# Patient Record
Sex: FEMALE | Race: WHITE | NOT HISPANIC OR LATINO | Employment: OTHER | URBAN - METROPOLITAN AREA
[De-identification: names, ages, dates, MRNs, and addresses within clinical notes are randomized per-mention and may not be internally consistent; named-entity substitution may affect disease eponyms.]

---

## 2017-07-19 ENCOUNTER — NON-PROVIDER VISIT (OUTPATIENT)
Dept: NEUROLOGY | Facility: MEDICAL CENTER | Age: 57
End: 2017-07-19
Payer: MEDICAID

## 2017-07-19 DIAGNOSIS — G56.03 CARPAL TUNNEL SYNDROME, BILATERAL: ICD-10-CM

## 2017-07-19 DIAGNOSIS — M54.12 CERVICAL RADICULOPATHY: ICD-10-CM

## 2017-07-19 DIAGNOSIS — R20.2 PARESTHESIAS: ICD-10-CM

## 2017-07-19 PROCEDURE — 99202 OFFICE O/P NEW SF 15 MIN: CPT | Mod: 25 | Performed by: PSYCHIATRY & NEUROLOGY

## 2017-07-19 PROCEDURE — 95910 NRV CNDJ TEST 7-8 STUDIES: CPT | Performed by: PSYCHIATRY & NEUROLOGY

## 2017-07-19 PROCEDURE — 95886 MUSC TEST DONE W/N TEST COMP: CPT | Performed by: PSYCHIATRY & NEUROLOGY

## 2017-07-19 NOTE — MR AVS SNAPSHOT
Iesha Ng Swanson   2017 10:30 AM   Non-Provider Visit   MRN: [id number]    Department:  [facility]   Dept Phone:  [phone]    Description:  Female : 1960   Provider:  [facility]           Reason for Visit     Procedure           Allergies as of 2017     No Known Allergies      You were diagnosed with     Paresthesias   [139239]       Carpal tunnel syndrome, bilateral   [177324]       Cervical radiculopathy   [[id number]]         Vital Signs     Smoking Status                   Unknown If Ever Smoked           Basic Information     Date Of Birth Sex Race Ethnicity Preferred Language    1960 Female White Non- English      Your appointments     2017 10:20 AM   New Patient with Greg Pa M.D.   [facility] Neurology (--)    [address] [place] [address], [address]  [facility] [id number] [phone]           Please bring Photo ID, Insurance Cards, All Medication Bottles and copies of any legal documents (such as Living Will, Power of ) If speaking a language besides English please bring an adult . Please arrive 30 minutes prior for check in and registration. You will be receiving a confirmation call a few days before your appointment from our automated call confirmation system.              Health Maintenance        Date Due Completion Dates    IMM DTaP/Tdap/Td Vaccine (1 - Tdap) 11/10/1978 ---    PAP SMEAR 11/10/1980 ---    MAMMOGRAM 11/10/1999 ---    COLONOSCOPY 11/10/2009 ---    IMM INFLUENZA (1) 2017 ---            Current Immunizations     No immunizations on file.      Below and/or attached are the medications your provider expects you to take. Review all of your home medications and newly ordered medications with your provider and/or pharmacist. Follow medication instructions as directed by your provider and/or pharmacist. Please keep your medication list with you and share with your provider. Update the information when  medications are discontinued, doses are changed, or new medications (including over-the-counter products) are added; and carry medication information at all times in the event of emergency situations     Allergies:  No Known Allergies          Medications  Valid as of: July 19, 2017 - 11:38 AM    Generic Name Brand Name Tablet Size Instructions for use    Citalopram Hydrobromide   Take  by mouth.          Omeprazole (CAPSULE DELAYED RELEASE) PRILOSEC 20 MG Take 20 mg by mouth every day.          Warfarin Sodium (Tab) COUMADIN 1 MG Take 1.5 mg by mouth every day.          .                 Medicines prescribed today were sent to:     None      Medication refill instructions:       If your prescription bottle indicates you have medication refills left, it is not necessary to call your provider’s office. Please contact your pharmacy and they will refill your medication.    If your prescription bottle indicates you do not have any refills left, you may request refills at any time through one of the following ways: The online Zenops system (except Urgent Care), by calling your provider’s office, or by asking your pharmacy to contact your provider’s office with a refill request. Medication refills are processed only during regular business hours and may not be available until the next business day. Your provider may request additional information or to have a follow-up visit with you prior to refilling your medication.   *Please Note: Medication refills are assigned a new Rx number when refilled electronically. Your pharmacy may indicate that no refills were authorized even though a new prescription for the same medication is available at the pharmacy. Please request the medicine by name with the pharmacy before contacting your provider for a refill.        Your To Do List     Future Labs/Procedures Complete By Expires    MR-CERVICAL SPINE-W/O  As directed 7/19/2018         Zenops Access Code:  [email or web address]  Expires: 8/18/2017 11:38 AM    Wheeldo  A secure, online tool to manage your health information     [employer]’s Wheeldo® is a secure, online tool that connects you to your personalized health information from the privacy of your home -- day or night - making it very easy for you to manage your healthcare. Once the activation process is completed, you can even access your medical information using the Wheeldo patrice, which is available for free in the Apple Patrice store or Google Play store.     Wheeldo provides the following levels of access (as shown below):   My Chart Features   [facility] Primary Care Doctor [facility]  Specialists [facility]  Urgent  Care Non-[facility]  Primary Care  Doctor   Email your healthcare team securely and privately 24/7 X X X    Manage appointments: schedule your next appointment; view details of past/upcoming appointments X      Request prescription refills. X      View recent personal medical records, including lab and immunizations X X X X   View health record, including health history, allergies, medications X X X X   Read reports about your outpatient visits, procedures, consult and ER notes X X X X   See your discharge summary, which is a recap of your hospital and/or ER visit that includes your diagnosis, lab results, and care plan. X X       How to register for Wheeldo:  1. Go to  [email or web address].  2. Click on the Sign Up Now box, which takes you to the New Member Sign Up page. You will need to provide the following information:  a. Enter your Wheeldo Access Code exactly as it appears at the top of this page. (You will not need to use this code after you’ve completed the sign-up process. If you do not sign up before the expiration date, you must request a new code.)   b. Enter your date of birth.   c. Enter your home email address.   d. Click Submit, and follow the next screen’s instructions.  3. Create a Wheeldo ID. This will be your Wheeldo login ID and cannot be  changed, so think of one that is secure and easy to remember.  4. Create a i2we password. You can change your password at any time.  5. Enter your Password Reset Question and Answer. This can be used at a later time if you forget your password.   6. Enter your e-mail address. This allows you to receive e-mail notifications when new information is available in i2we.  7. Click Sign Up. You can now view your health information.    For assistance activating your i2we account, call [phone]

## 2017-07-19 NOTE — PROGRESS NOTES
ELECTRODIAGNOSTIC CONSULTATION:           NERVE CONDUCTION STUDIES AND ELECTROMYOGRAPHY REPORT        DOS: 07/19/2017      Referring provider: Adelita Napoles M.D.      SUMMARY OF PATIENT'S CLINICAL HISTORY,PHYSICAL EXAM, AND RATIONALE FOR TESTING:    Ms. Iesha Swanson 56 y.o. presenting with bilateral upper extremity pain, neck pain, pain and numbness in hands. Studies done for work up of possible bilateral carpal tunnel syndrome, and bilateral cervical radiculopathies.     There is a remote history of CTS bilaterally, s/p decompression about 15 years ago. Current symptoms progressive for the last 10 years.     Patient on chronic anticoagulation with Xarelto. Discussed risk for bleeding. She agreed to proceed.       Past Medical History is significant for :   Past Medical History   Diagnosis Date   • DVT (deep venous thrombosis)    • Depression      A brief examination demonstrated patient not in distress. She was appropriate. She spoke normally. She had normal muscle strength and there were no signs of atrophy. She exhibited normal coordination and gait.       ELECTRODIAGNOSTIC EXAMINATION:  Nerve conduction studies (NCS) and electromyography (EMG) are utilized to evaluate direct or indirect damage to the peripheral nervous system. NCS are performed to measure the nerve(s) response(s) to electrostimulation across a given nerve segment. EMG evaluates the passive and active electrical activity of the muscle(s) in question.  Muscles are innervated by specific peripheral nerves and roots. Often times, several nerves the muscle to be examined in order to determine the presence or absence of the disease process. Furthermore, nerves and muscles may need to be tested in a [id number] comparison, as well as in additional extremities, as this may be crucial in characterizing the extent of the disease process, which may be diffuse or isolated and of varying degree of severity. The extent of the neurodiagnostic  exam is justified as it may help arrive to a proper diagnosis, which ultimately may contribute to better management of the patient. Therefore, the nerves to muscles examined during the study were medically necessary.    Unless otherwise noted, temperature of the extremity(s) study was monitored before and during the examination and remained between 32 and 36 degrees C for the upper extremities, and between 30 and 36 degrees C for the lower extremities.      NERVE CONDUCTION STUDIES:  Sensory nerves:   - Bilateral Median sensory nerves were examined.The responses were within normal limits.  - Bilateral Ulnar sensory nerves were examined. The responses were within normal limits.    Motor nerves:   - Bilateral Median motor nerves were examined. Recording electrodes placed at the Abductor Pollicis Brevis muscles. The responses were within normal limits.  - Bilateral Ulnar motor nerves were examined. Recording electrodes placed at the Abductor Digiti Minimi muscles. The responses were within normal limits.    No temporal dispersion or conduction block observed in any of the motor nerves examined.    LATE RESPONSES:  F waves were obtained and the following nerves: bilateral median, bilateral ulnar.   The responses within normal limits for the patient's age.        ELECTROMYOGRAPHY:  The study was performed the concentric needle electrode. Fibrillation and fasciculation activity is graded by convention from none (0) to continuous (4+).  Needle electrode examination was performed in the following muscles: bilateral low cervical paraspinals, bilateral deltoids, bilateral biceps, bilateral triceps, bilateral abductor digiti minimi, bilateral abductor pollicis brevis.   The muscles tested demonstrated normal inserctional activity, normal motor unit morphology and recruitment. There were no elements suggestive of active denervation.    Sensory NCS      Nerve / Sites Rec. Site Onset Lat Peak Lat NP Amp PP Amp SPAR Segments  Distance Velocity     ms ms µV µV %  cm m/s   R MEDIAN - Dig II Antidr      Wrist Dig II 2.45 3.30 12.1 29.7 66.7 Wrist - Dig II 14 57.1   L MEDIAN - Dig II Antidr      Wrist Dig II 2.20 2.90 18.2 30.0 100 Wrist - Dig II 14 63.6   R ULNAR - Dig V Antidr      Wrist Dig V 2.25 3.20 15.4 29.3 100 Wrist - Dig V 14 62.2   L ULNAR - Dig V Antidr      Wrist Dig V 2.40 3.00 13.4 61.5 87 Wrist - Dig V 14 58.3               Motor NCS      Nerve / Sites Rec. Site Lat Amp Rel Amp SPAR Segments Dist. Alejandro     ms mV % %  cm m/s   R MEDIAN - APB      Wrist APB 2.90 9.1 100 99.5 Wrist - APB 8       Elbow APB 7.30 8.0 88.1 96.8 Elbow - Wrist 25.5 58.0   L MEDIAN - APB      Wrist APB 3.00 9.1 100 100 Wrist - APB 8       Elbow APB 7.40 8.3 90.5 100 Elbow - Wrist 25 56.8   R ULNAR - ADM      Wrist ADM 2.80 11.7 100 81.1 Wrist - ADM 8       B.Elbow ADM 5.10 9.9 85 83.7 B.Elbow - Wrist 15.5 67.4      A.Elbow ADM 6.80 10.2 87.1 88 A.Elbow - B.Elbow 10 58.8   L ULNAR - ADM      Wrist ADM 2.75 14.4 100 100 Wrist - ADM 8       B.Elbow ADM 5.20 11.8 82.4 100 B.Elbow - Wrist 13 53.1      A.Elbow ADM 6.90 11.5 80.3 100 A.Elbow - B.Elbow 10 58.8               F  Wave      Nerve Fmin    ms   R MEDIAN 25.15   R ULNAR 25.15   L MEDIAN 25.10   L ULNAR 25.30         DIAGNOSTIC INTERPRETATION:   Extensive electrodiagnostic studies were performed to the upper extremities. The studies were within normal limits.      CLINICAL DISCUSSION:   No evidence for peripheral polyneuropathy, median neuropathies (Carpal Tunnel Syndrome), or suggestion of cervical radiculopathies based on this study.       RECOMMENDATION:   Patient complaining of neck pain. NCS/EMG was normal. MRI c spine will be ordered. She can reviewed results with me over the telephone and/or her PCP. Symptoms may otherwise not be neurological in origin.       Giorgi Marquez MD  Medical Director, Epilepsy and Neurodiagnostics.   Clinical  of Neurology [facility].   Diplomate in Neurology, Epilepsy, and Electrodiagnostic Medicine.   Office: [phone]  Fax: [phone]

## 2017-08-03 ENCOUNTER — HOSPITAL ENCOUNTER (OUTPATIENT)
Dept: RADIOLOGY | Facility: MEDICAL CENTER | Age: 57
End: 2017-08-03
Attending: PSYCHIATRY & NEUROLOGY
Payer: MEDICAID

## 2017-08-03 DIAGNOSIS — R20.2 PARESTHESIAS: ICD-10-CM

## 2017-08-03 DIAGNOSIS — M54.12 CERVICAL RADICULOPATHY: ICD-10-CM

## 2017-08-03 PROCEDURE — 72141 MRI NECK SPINE W/O DYE: CPT

## 2017-08-07 ENCOUNTER — TELEPHONE (OUTPATIENT)
Dept: NEUROLOGY | Facility: MEDICAL CENTER | Age: 57
End: 2017-08-07

## 2017-08-07 DIAGNOSIS — M54.2 NECK PAIN: ICD-10-CM

## 2017-08-07 DIAGNOSIS — M54.12 CERVICAL RADICULOPATHY: ICD-10-CM

## 2017-08-07 NOTE — TELEPHONE ENCOUNTER
Giorgi Marquez M.D.  Macey Dickson, Med Ass't       Caller: Unspecified (Today, 2:01 PM)                     I reviewed report and images. Some degenerative changes. Disc herniation at C6-7. Possibly compressing roots bilaterally. We will mail report to her.   It would be up to her if she would like to see a spine surgeon. We can place referral if she would like. A course of PT may help as well. Please advise.   RA       Advised Pt. She would like you to order PT. Please make a comment that she needs a PT in [place].     Thanks Macey ZHOU

## 2021-06-22 ENCOUNTER — TELEPHONE (OUTPATIENT)
Dept: CARDIOLOGY | Facility: MEDICAL CENTER | Age: 61
End: 2021-06-22

## 2021-06-22 NOTE — TELEPHONE ENCOUNTER
Chart Prep    Called patient in regards to records for NP appointment with Dr. Bell on 07/02/2021 at 02:20pm. To review most recent lab results, ekg, any cardiac testing or ,if he has been treated by a cardiologist. No answer, left voicemail to call back

## 2021-06-23 NOTE — TELEPHONE ENCOUNTER
Pt called back. PT is getting labs done but no Cards in the past and no cardiac testing.     Thank you,  Loni CORRIGAN

## 2021-09-02 ENCOUNTER — OFFICE VISIT (OUTPATIENT)
Dept: CARDIOLOGY | Facility: MEDICAL CENTER | Age: 61
End: 2021-09-02
Payer: MEDICAID

## 2021-09-02 VITALS
RESPIRATION RATE: 16 BRPM | DIASTOLIC BLOOD PRESSURE: 82 MMHG | WEIGHT: 178.6 LBS | SYSTOLIC BLOOD PRESSURE: 138 MMHG | BODY MASS INDEX: 30.49 KG/M2 | HEIGHT: 64 IN

## 2021-09-02 DIAGNOSIS — Z72.0 TOBACCO USE: ICD-10-CM

## 2021-09-02 DIAGNOSIS — Z82.49 FAMILY HISTORY OF ABDOMINAL AORTIC ANEURYSM (AAA): ICD-10-CM

## 2021-09-02 DIAGNOSIS — R07.2 PRECORDIAL PAIN: ICD-10-CM

## 2021-09-02 DIAGNOSIS — M48.062 SPINAL STENOSIS OF LUMBAR REGION WITH NEUROGENIC CLAUDICATION: ICD-10-CM

## 2021-09-02 DIAGNOSIS — I73.9 PVD (PERIPHERAL VASCULAR DISEASE) (HCC): ICD-10-CM

## 2021-09-02 DIAGNOSIS — Z86.718 HISTORY OF DVT (DEEP VEIN THROMBOSIS): ICD-10-CM

## 2021-09-02 DIAGNOSIS — G47.33 OSA (OBSTRUCTIVE SLEEP APNEA): ICD-10-CM

## 2021-09-02 LAB — EKG IMPRESSION: NORMAL

## 2021-09-02 PROCEDURE — 99406 BEHAV CHNG SMOKING 3-10 MIN: CPT | Performed by: INTERNAL MEDICINE

## 2021-09-02 PROCEDURE — 99204 OFFICE O/P NEW MOD 45 MIN: CPT | Mod: 25 | Performed by: INTERNAL MEDICINE

## 2021-09-02 PROCEDURE — 93000 ELECTROCARDIOGRAM COMPLETE: CPT | Performed by: INTERNAL MEDICINE

## 2021-09-02 RX ORDER — LORAZEPAM 2 MG/ML
INJECTION INTRAMUSCULAR
COMMUNITY
Start: 2021-07-26 | End: 2021-09-02

## 2021-09-02 RX ORDER — ASPIRIN 81 MG/1
81 TABLET, CHEWABLE ORAL DAILY
COMMUNITY

## 2021-09-02 RX ORDER — ATORVASTATIN CALCIUM 40 MG/1
TABLET, FILM COATED ORAL
COMMUNITY
Start: 2021-07-15

## 2021-09-02 RX ORDER — TIOTROPIUM BROMIDE 18 UG/1
CAPSULE ORAL; RESPIRATORY (INHALATION)
COMMUNITY
Start: 2021-07-13

## 2021-09-02 RX ORDER — RIVAROXABAN 20 MG/1
20 TABLET, FILM COATED ORAL
COMMUNITY
Start: 2021-06-13 | End: 2022-01-25 | Stop reason: SDUPTHER

## 2021-09-02 RX ORDER — AMOXICILLIN AND CLAVULANATE POTASSIUM 875; 125 MG/1; MG/1
TABLET, FILM COATED ORAL
COMMUNITY
Start: 2021-07-23 | End: 2022-01-25

## 2021-09-02 RX ORDER — ONDANSETRON 4 MG/1
TABLET, ORALLY DISINTEGRATING ORAL
COMMUNITY
Start: 2021-07-29 | End: 2022-01-25

## 2021-09-02 RX ORDER — METOCLOPRAMIDE 10 MG/1
TABLET ORAL
COMMUNITY
Start: 2021-07-02 | End: 2021-09-02

## 2021-09-02 RX ORDER — CILOSTAZOL 100 MG/1
100 TABLET ORAL 2 TIMES DAILY
COMMUNITY
Start: 2021-08-27 | End: 2022-01-25 | Stop reason: SDUPTHER

## 2021-09-02 RX ORDER — POTASSIUM CHLORIDE 29.8 MG/ML
INJECTION INTRAVENOUS
COMMUNITY
Start: 2021-07-01 | End: 2021-09-02

## 2021-09-02 RX ORDER — MELOXICAM 15 MG/1
TABLET ORAL
COMMUNITY
Start: 2021-08-16 | End: 2022-01-25

## 2021-09-02 RX ORDER — PREGABALIN 150 MG/1
CAPSULE ORAL
COMMUNITY
Start: 2021-08-09

## 2021-09-02 RX ORDER — DULOXETIN HYDROCHLORIDE 60 MG/1
CAPSULE, DELAYED RELEASE ORAL
COMMUNITY
Start: 2021-08-25

## 2021-09-02 RX ORDER — METOCLOPRAMIDE 5 MG/1
TABLET ORAL
COMMUNITY
Start: 2021-07-02 | End: 2021-09-02

## 2021-09-02 RX ORDER — BUSPIRONE HYDROCHLORIDE 30 MG/1
30 TABLET ORAL
COMMUNITY
Start: 2021-08-27

## 2021-09-02 RX ORDER — PROMETHAZINE HYDROCHLORIDE 25 MG/1
TABLET ORAL
COMMUNITY
Start: 2021-07-06 | End: 2021-09-02

## 2021-09-02 RX ORDER — FLUTICASONE PROPIONATE 50 MCG
SPRAY, SUSPENSION (ML) NASAL
COMMUNITY
Start: 2021-09-01

## 2021-09-02 RX ORDER — PANTOPRAZOLE SODIUM 40 MG/1
TABLET, DELAYED RELEASE ORAL
COMMUNITY
Start: 2021-06-14

## 2021-09-02 RX ORDER — CEFTRIAXONE 1 G/1
INJECTION, POWDER, FOR SOLUTION INTRAMUSCULAR; INTRAVENOUS
COMMUNITY
Start: 2021-07-23 | End: 2021-09-02

## 2021-09-02 NOTE — PROGRESS NOTES
CardiacCARDIOLOGY NEW PATIENT CONSULTATION    PCP: Adelita Napoles M.D.    1. PVD (peripheral vascular disease) (HCC)    2. Precordial pain    3. History of DVT (deep vein thrombosis)    4. Tobacco use    5. Family history of abdominal aortic aneurysm (AAA)    6. MARIS (obstructive sleep apnea)    7. Spinal stenosis of lumbar region with neurogenic claudication      Iesha Swanson has peripheral vascular disease and numerous risk factors for vascular disease.  I recommended a lower extremity arterial duplex, abdominal aortic duplex, carotid duplex and stress MPI.  I advised that she abstain from nicotine and all forms.  I counseled her for greater than 4 minutes regarding this.    Given the history of predominantly below the knee PAD, there may be limited options for revascularization; which would be best reserved for CLI.  I did  her about the importance of caring for her feet.    Follow up: 3 months    Chief Complaint   Patient presents with   • Peripheral Vascular Disease (PVD)     Dx: Peripheral vascular disease, unspecified       History: Iesha Swanson is a 60 y.o. female with a history of DVT, sleep apnea, multiple musculoskeletal complaints including spinal stenosis with neurogenic claudication and tobacco abuse presenting for evaluation of peripheral vascular disease.  She has been previously diagnosed with a left popliteal occlusion and has longstanding left claudication but recently has been developing right calf claudication as well.  She also notices a bluish-black discoloration of her left great toe which is intermittent.  She has been advised to quit smoking and occasionally still vapes though only rarely.  She can walk 6 blocks with difficulty.    She also has numerous musculoskeletal pains throughout the back shoulder and hips and knees as well as neurogenic claudication.  She also experiences a shortness of breath and discomfort in the chest with physical  "exertion.      ROS:   All other systems reviewed and negative except as per the HPI    PE:  /82 (BP Location: Left arm, Patient Position: Sitting, BP Cuff Size: Adult)   Resp 16   Ht 1.626 m (5' 4\")   Wt 81 kg (178 lb 9.6 oz)   BMI 30.66 kg/m² : Pulse: 80 bpm  Gen: no acute distress  HEENT: Symmetric face. Anicteric sclerae. Moist mucus membranes  NECK: No JVD. No lymphadenopathy  CARDIAC: Regular, Normal S1, S2, No murmur  VASCULATURE: carotids are normal bilaterally without bruit  RESP: Clear to auscultation bilaterally  ABD: Soft, non-tender, non-distended  EXT: No edema, no clubbing or cyanosis  SKIN: Warm and dry  NEURO: No gross deficits  PSYCH: Appropriate affect, participates in conversation    The ASCVD Risk score (Maurilio WILLIAM Jr, et al., 2013) failed to calculate.    Past Medical History:   Diagnosis Date   • Depression    • DVT (deep venous thrombosis) (HCC)      History reviewed. No pertinent surgical history.  No Known Allergies  Outpatient Encounter Medications as of 9/2/2021   Medication Sig Dispense Refill   • PROAIR  (90 Base) MCG/ACT Aero Soln inhalation aerosol      • amoxicillin-clavulanate (AUGMENTIN) 875-125 MG Tab      • atorvastatin (LIPITOR) 40 MG Tab      • busPIRone (BUSPAR) 30 MG tablet      • cilostazol (PLETAL) 100 MG Tab      • DULoxetine (CYMBALTA) 60 MG Cap DR Particles delayed-release capsule      • fluticasone (FLONASE) 50 MCG/ACT nasal spray      • fluticasone-salmeterol (ADVAIR) 250-50 MCG/DOSE AEROSOL POWDER, BREATH ACTIVATED      • meloxicam (MOBIC) 15 MG tablet      • ondansetron (ZOFRAN ODT) 4 MG TABLET DISPERSIBLE      • pantoprazole (PROTONIX) 40 MG Tablet Delayed Response      • pregabalin (LYRICA) 150 MG Cap      • XARELTO 20 MG Tab tablet      • SPIRIVA HANDIHALER 18 MCG Cap      • aspirin (ASA) 81 MG Chew Tab chewable tablet Chew 81 mg every day.     • omeprazole (PRILOSEC) 20 MG CPDR Take 20 mg by mouth every day.       • [DISCONTINUED] cefTRIAXone " (ROCEPHIN) 1 GM Recon Soln  (Patient not taking: Reported on 9/2/2021)     • [DISCONTINUED] LORazepam (ATIVAN) 2 MG/ML Solution  (Patient not taking: Reported on 9/2/2021)     • [DISCONTINUED] metoclopramide (REGLAN) 5 MG tablet  (Patient not taking: Reported on 9/2/2021)     • [DISCONTINUED] metoclopramide (REGLAN) 10 MG Tab  (Patient not taking: Reported on 9/2/2021)     • [DISCONTINUED] potassium chloride Solution  (Patient not taking: Reported on 9/2/2021)     • [DISCONTINUED] promethazine (PHENERGAN) 25 MG Tab  (Patient not taking: Reported on 9/2/2021)     • [DISCONTINUED] warfarin (COUMADIN) 1 MG TABS Take 1.5 mg by mouth every day.   (Patient not taking: Reported on 9/2/2021)     • [DISCONTINUED] CITALOPRAM HYDROBROMIDE PO Take  by mouth.   (Patient not taking: Reported on 9/2/2021)       No facility-administered encounter medications on file as of 9/2/2021.     Social History     Socioeconomic History   • Marital status:      Spouse name: Not on file   • Number of children: Not on file   • Years of education: Not on file   • Highest education level: Not on file   Occupational History   • Not on file   Tobacco Use   • Smoking status: Never Smoker   • Smokeless tobacco: Never Used   Substance and Sexual Activity   • Alcohol use: Yes   • Drug use: No   • Sexual activity: Not on file   Other Topics Concern   • Not on file   Social History Narrative   • Not on file     Social Determinants of Health     Financial Resource Strain:    • Difficulty of Paying Living Expenses:    Food Insecurity:    • Worried About Running Out of Food in the Last Year:    • Ran Out of Food in the Last Year:    Transportation Needs:    • Lack of Transportation (Medical):    • Lack of Transportation (Non-Medical):    Physical Activity:    • Days of Exercise per Week:    • Minutes of Exercise per Session:    Stress:    • Feeling of Stress :    Social Connections:    • Frequency of Communication with Friends and Family:    •  Frequency of Social Gatherings with Friends and Family:    • Attends Buddhist Services:    • Active Member of Clubs or Organizations:    • Attends Club or Organization Meetings:    • Marital Status:    Intimate Partner Violence:    • Fear of Current or Ex-Partner:    • Emotionally Abused:    • Physically Abused:    • Sexually Abused:      History reviewed. No pertinent family history.      Studies  No results found for: CHOLSTRLTOT, LDL, HDL, TRIGLYCERIDE    Lab Results   Component Value Date/Time    SODIUM 137 02/05/2013 05:27 AM    POTASSIUM 3.8 02/05/2013 05:27 AM    CHLORIDE 104 02/05/2013 05:27 AM    CO2 25 02/05/2013 05:27 AM    GLUCOSE 140 (H) 02/05/2013 05:27 AM    BUN 12 02/05/2013 05:27 AM    CREATININE 0.95 02/05/2013 05:27 AM     Lab Results   Component Value Date/Time    ALKPHOSPHAT 64 02/05/2013 05:27 AM    ASTSGOT 20 02/05/2013 05:27 AM    ALTSGPT 36 02/05/2013 05:27 AM    TBILIRUBIN 0.4 02/05/2013 05:27 AM        For this encounter I reviewed the following medical records PCP notes and Vascular ultrasound     For this encounter I directly reviewed ECG tracings. I agree with the interpretations in the electronic health record.

## 2021-09-27 ENCOUNTER — HOSPITAL ENCOUNTER (OUTPATIENT)
Dept: RADIOLOGY | Facility: MEDICAL CENTER | Age: 61
End: 2021-09-27
Attending: INTERNAL MEDICINE
Payer: MEDICAID

## 2021-09-27 DIAGNOSIS — I73.9 PVD (PERIPHERAL VASCULAR DISEASE) (HCC): ICD-10-CM

## 2021-09-27 PROCEDURE — 93926 LOWER EXTREMITY STUDY: CPT | Mod: LT

## 2021-09-27 PROCEDURE — 93922 UPR/L XTREMITY ART 2 LEVELS: CPT | Mod: 26 | Performed by: INTERNAL MEDICINE

## 2021-09-27 PROCEDURE — 93880 EXTRACRANIAL BILAT STUDY: CPT

## 2021-09-27 PROCEDURE — 93922 UPR/L XTREMITY ART 2 LEVELS: CPT

## 2021-09-27 PROCEDURE — 93880 EXTRACRANIAL BILAT STUDY: CPT | Mod: 26 | Performed by: INTERNAL MEDICINE

## 2021-09-29 ENCOUNTER — TELEPHONE (OUTPATIENT)
Dept: CARDIOLOGY | Facility: MEDICAL CENTER | Age: 61
End: 2021-09-29

## 2021-09-29 NOTE — TELEPHONE ENCOUNTER
Clive Bell M.D.   9/28/2021 10:42 AM PDT       The vascular study confirms the presence of severe obstructive disease involving the lower parts of the legs.  Again this is best treated with smoking cessation and regular walking.  Although percutaneous revascularization techniques (balloon/stents) could be utilized-it is best to reserve these options for more compelling circumstances.  No changes to treatment plan for now.      Clive Bell M.D.   9/27/2021  2:50 PM PDT       Carotids look good- no significant stenosis.      LVM with pt at [phone] requesting call back to discuss testing results.

## 2021-10-01 ENCOUNTER — HOSPITAL ENCOUNTER (OUTPATIENT)
Dept: RADIOLOGY | Facility: MEDICAL CENTER | Age: 61
End: 2021-10-01
Attending: INTERNAL MEDICINE
Payer: MEDICAID

## 2021-10-01 DIAGNOSIS — I73.9 PVD (PERIPHERAL VASCULAR DISEASE) (HCC): ICD-10-CM

## 2021-10-01 PROCEDURE — A9502 TC99M TETROFOSMIN: HCPCS

## 2021-10-01 PROCEDURE — 78452 HT MUSCLE IMAGE SPECT MULT: CPT | Mod: 26 | Performed by: INTERNAL MEDICINE

## 2021-10-01 PROCEDURE — 93018 CV STRESS TEST I&R ONLY: CPT | Performed by: INTERNAL MEDICINE

## 2021-10-01 RX ORDER — REGADENOSON 0.08 MG/ML
INJECTION, SOLUTION INTRAVENOUS
Status: DISCONTINUED
Start: 2021-10-01 | End: 2021-10-02 | Stop reason: HOSPADM

## 2021-10-02 NOTE — TELEPHONE ENCOUNTER
Clive Bell M.D.   10/1/2021  5:01 PM PDT       The stress test looks good.  No signs of restricted blood flow to the heart.

## 2021-10-06 NOTE — TELEPHONE ENCOUNTER
Called pt and notified of stress test, carotid US, and lower extremity US results. She would also like to schedule her 3 month f/u. Scheduled with BE on 12/3.

## 2021-11-11 ENCOUNTER — TELEPHONE (OUTPATIENT)
Dept: SCHEDULING | Facility: IMAGING CENTER | Age: 61
End: 2021-11-11

## 2021-11-11 NOTE — TELEPHONE ENCOUNTER
You  Clive Bell M.D. 2 hours ago (11:37 AM)       Pt reporting black pinkie toenail and is wondering if this could be due to arterial disease and if she needs to do anything differently.   Thanks!      Clive Bell M.D.  You 1 hour ago (12:44 PM)       Could be arterial disease or simply a bruise under the nail- strange to be limited just to the nail. Perhaps she should come in and discuss with Dr. Benitez if intervention should be considered.      Called pt and discussed the potential of seeing AK to discuss peripheral intervention. She would like to treat it as a fungal infection first, and if this doesn't work and she is still experiencing issues she will consider scheduling with AK.

## 2021-11-11 NOTE — TELEPHONE ENCOUNTER
BE    Patient called to avise they are having blockage and pinky toe nail is black and tender, they went to PCP and they advised fungus, the Pt is not sure that is the case. This has been going on for a few weeks. It is uncomfortable.     Thank you,  Loni CORRIGAN

## 2021-11-11 NOTE — TELEPHONE ENCOUNTER
Returned call. Pt explains that for about two weeks her left pinkie toe has had a black toenail. The toe itself doesn't appear to be discolored. Her toes feel cold and numb, but she says this is normal for her. She doesn't remember injuring the toe, but with the numbness she can't be sure. She was planning on treating it with an anti-fungal medication, but wanted to run it by us first to see if it might be due to her arterial disease.

## 2022-01-25 ENCOUNTER — OFFICE VISIT (OUTPATIENT)
Dept: CARDIOLOGY | Facility: MEDICAL CENTER | Age: 62
End: 2022-01-25
Payer: MEDICAID

## 2022-01-25 VITALS
SYSTOLIC BLOOD PRESSURE: 120 MMHG | BODY MASS INDEX: 30.39 KG/M2 | WEIGHT: 178 LBS | DIASTOLIC BLOOD PRESSURE: 62 MMHG | OXYGEN SATURATION: 94 % | HEART RATE: 70 BPM | RESPIRATION RATE: 18 BRPM | HEIGHT: 64 IN

## 2022-01-25 DIAGNOSIS — Z86.718 HISTORY OF DVT (DEEP VEIN THROMBOSIS): ICD-10-CM

## 2022-01-25 DIAGNOSIS — Z82.49 FAMILY HISTORY OF ABDOMINAL AORTIC ANEURYSM (AAA): ICD-10-CM

## 2022-01-25 DIAGNOSIS — I73.9 PAD (PERIPHERAL ARTERY DISEASE) (HCC): ICD-10-CM

## 2022-01-25 DIAGNOSIS — R29.818 NEUROGENIC CLAUDICATION: ICD-10-CM

## 2022-01-25 DIAGNOSIS — Z72.0 TOBACCO ABUSE: ICD-10-CM

## 2022-01-25 PROCEDURE — 99214 OFFICE O/P EST MOD 30 MIN: CPT | Mod: 25 | Performed by: INTERNAL MEDICINE

## 2022-01-25 PROCEDURE — 99406 BEHAV CHNG SMOKING 3-10 MIN: CPT | Performed by: INTERNAL MEDICINE

## 2022-01-25 RX ORDER — CILOSTAZOL 100 MG/1
100 TABLET ORAL 2 TIMES DAILY
Qty: 180 TABLET | Refills: 3 | Status: SHIPPED | OUTPATIENT
Start: 2022-01-25 | End: 2023-03-27 | Stop reason: SDUPTHER

## 2022-01-25 RX ORDER — RIVAROXABAN 20 MG/1
20 TABLET, FILM COATED ORAL
Qty: 90 TABLET | Refills: 3 | Status: SHIPPED | OUTPATIENT
Start: 2022-01-25

## 2022-01-25 RX ORDER — ONDANSETRON 8 MG/1
TABLET, ORALLY DISINTEGRATING ORAL
COMMUNITY
Start: 2021-12-16 | End: 2022-01-25

## 2022-01-25 RX ORDER — PROMETHAZINE HYDROCHLORIDE 25 MG/ML
INJECTION, SOLUTION INTRAMUSCULAR; INTRAVENOUS
COMMUNITY
Start: 2021-12-12 | End: 2022-01-25

## 2022-01-25 RX ORDER — LORAZEPAM 2 MG/ML
INJECTION INTRAMUSCULAR
COMMUNITY
Start: 2021-12-12 | End: 2022-01-25
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
## 2022-01-26 ENCOUNTER — TELEPHONE (OUTPATIENT)
Dept: CARDIOLOGY | Facility: MEDICAL CENTER | Age: 62
End: 2022-01-26

## 2022-01-26 NOTE — TELEPHONE ENCOUNTER
Called Eliana back from pharmacy and gave her ICD 10 for hx of DVT. She states she is still having issues on her end so she will contact patient's insurance for an override. She states this medication has been on hold since September of last year.

## 2022-01-26 NOTE — TELEPHONE ENCOUNTER
"Shelly Dumont from the [facility] called to state the patients insurance is denying the refill due to needing an updated \"ICD-10\" code for the script XARELTO 20 MG Tab tablet. Can you please call her back at [phone].      Thank you,    JOSE  "

## 2022-01-27 NOTE — TELEPHONE ENCOUNTER
Eliana called back to see if she could use i48.0 ICD 10. Advised that is for AF and patient doesn't have documentation of AF.     We tried i82.409, and it went through.

## 2022-02-16 DIAGNOSIS — I73.9 PAD (PERIPHERAL ARTERY DISEASE) (HCC): ICD-10-CM

## 2022-02-22 ENCOUNTER — TELEPHONE (OUTPATIENT)
Dept: CARDIOLOGY | Facility: MEDICAL CENTER | Age: 62
End: 2022-02-22
Payer: MEDICAID

## 2022-02-22 NOTE — TELEPHONE ENCOUNTER
----- Message from Clive Bell M.D. sent at 2/21/2022  3:43 PM PST -----  Labs look good. Continue current treatment plan

## 2022-08-29 ENCOUNTER — TELEPHONE (OUTPATIENT)
Dept: CARDIOLOGY | Facility: MEDICAL CENTER | Age: 62
End: 2022-08-29
Payer: MEDICAID

## 2022-08-29 NOTE — TELEPHONE ENCOUNTER
BE      Caller: Iesha Swanson    Topic/issue: Patient was calling about her XARELTO 20 MG Tab tablet medication and was asking if there was a generic brand that could be written for her since that medication cost more then expected  Callback Number: [phone] (home)       Thank you    -Bill SHER

## 2022-08-30 NOTE — TELEPHONE ENCOUNTER
Spoke to patient over phone. Discussed that we would look into if she requires a PA or if an alternative would be a better option for her. Advised we would reach out with update.    88

## 2022-09-06 NOTE — TELEPHONE ENCOUNTER
Called patient [place] pharmacy, spoke to Shelly   Relayed message from PA, asking Shelly if they can please reach out to her Medicaid since the error is not on our end per Cover my meds. No PA needed. Pharmacy to submit correctly.  Shelly states they will reach out.

## 2022-09-06 NOTE — TELEPHONE ENCOUNTER
CHRISTIANO FLORES Key: [id number] - PA Case ID: [id number] help? Call us at [phone]  Outcome  Additional Information Required  Based on [place] information, this Recipient has primary insurance coverage. The dispensing pharmacy needs to bill the primary insurance before [place]. Provided that XARELTO 20 MG TABLET is not excluded by [place] and [place] [employer] is the secondary payer on the claim, no authorization will be necessary. Please submit your prescription to the patient's pharmacy.  Drug  Xarelto 20MG tablets  Form  [employer] [place] Medicaid Electronic PA Form

## 2022-09-06 NOTE — TELEPHONE ENCOUNTER
Sorry for the delayed response.  Spoke to patients [place] pharmacy. Patients primary payer is Medicare, covering Xarelto with $210 co-pay, running through Medicaid to  this cost. Medicaid denying.   Reviewed ICD-10 just to make sure pharmacy was running the correct code. Code is correct. Not allowing pharmacy to send PA notification to our office. Will manually create PA.

## 2022-09-30 ENCOUNTER — PATIENT MESSAGE (OUTPATIENT)
Dept: CARDIOLOGY | Facility: MEDICAL CENTER | Age: 62
End: 2022-09-30
Payer: MEDICAID

## 2023-03-27 DIAGNOSIS — R29.818 NEUROGENIC CLAUDICATION: ICD-10-CM

## 2023-03-27 NOTE — TELEPHONE ENCOUNTER
Is the patient due for a refill? Yes    Was the patient seen the past year? No    Date of last office visit: 1/25/2022    Does the patient have an upcoming appointment?  No       Provider to refill:BE    Does the patients insurance require a 100 day supply?  No

## 2023-03-28 RX ORDER — CILOSTAZOL 100 MG/1
100 TABLET ORAL 2 TIMES DAILY
Qty: 180 TABLET | Refills: 0 | Status: SHIPPED | OUTPATIENT
Start: 2023-03-28

## 2023-05-01 ENCOUNTER — TELEPHONE (OUTPATIENT)
Dept: CARDIOLOGY | Facility: MEDICAL CENTER | Age: 63
End: 2023-05-01
Payer: MEDICAID

## 2023-05-01 NOTE — TELEPHONE ENCOUNTER
Last OV: 1/25/22  Proposed Surgery: caudal epidural   Surgery Date: 5/3/23  Requesting Office Name: [facility]  Fax Number: [phone]  Preference of Location (default is surgery center unless specified by Cardiologist or JEWELS)  Prior Clearance Addressed: No      Anticoags/Antiplatelets: Aspirin and Xarelto  Outstanding Cardiac Imaging : No  Stent, Cardiac Devices, or Catheterization: No  Ablation, TAVR, Cardioversion: No  Recent Cardiac Hospitalization: No            When: N/A  History (cardiac history):   Past Medical History:   Diagnosis Date    Depression     DVT (deep venous thrombosis) (HCC)              Surgical Clearance Letter Sent: YES   **Scan clearance request letter into [facility].**

## 2023-05-01 NOTE — LETTER
Date: 5/1/2023     Dear Surgeon or Proceduralist,      Thank you for your request for cardiac stratification of our mutual patient Iesha Swanson 1960. We have reviewed their [facility] records; and to the best of our understanding this patient has not had stenting, ablation, cardiothoracic surgery or hospitalization for cardiovascular reasons in the past 6 months.  Iesha Swanson has been seen within the past 18 months and is considered to have non-modifiable cardiac risk for this low-risk procedure/surgery. They may proceed from a cardiovascular standpoint and may hold their antiplatelet/anticoagulation as briefly as possible. Please have patient resume this medication when hemodynamically stable to do so.     Aspirin or Prasugrel   - hold 7 days prior to procedure/surgery, resume when hemodynamically stable      Clopidrogrel or Ticagrelor  - hold 7 days for all neurological procedures, hold 5 days prior to all other procedure/surgery,  resume when hemodynamically stable     Warfarin - hold 7 days for all neurological procedures, hold 5 days prior to all other procedure/surgery and coordinate with [facility] ([phone]) INR testing and dose management.      Pradaxa/Xarelto/Eliquis/Savesya - hold 1 day prior to procedure for low bleeding risk procedure, 2 days for high bleeding risk procedure, or consider holding 3 days or longer for patients with reduced kidney function (CrCl <30mL/min) or spinal/cranial surgeries/procedures.      If they have a mechanical heart valve, please coordinate with [facility] ([phone]) the proper management of their anticoagulant in the periprocedural or perioperative period.      Some patients have higher risk for cardiovascular complications or holding medication. If our patient has had prior complications of holding antiplatelet or anticoagulants in the past and we have seen them after these events, we have addressed  these concerns with the patient. They are at an unknown degree of increased risk for recurrent complication.  You may hold anticoagulation/antiplatelets for the procedure or surgery if the benefits of the procedure or surgery outweigh this nonmodifiable risk.      If Iesha Swanson 1960 has new symptoms of heart failure decompensation, unstable arrythmia, or angina please reach out and we will assess the patient.      If you have other patient-specific concerns, please feel free to reach out to the patient's cardiologist directly at [phone].     Thank you,       [facility]

## 2024-04-03 ENCOUNTER — HOSPITAL ENCOUNTER (INPATIENT)
Facility: MEDICAL CENTER | Age: 64
LOS: 2 days | DRG: 192 | End: 2024-04-05
Attending: EMERGENCY MEDICINE | Admitting: FAMILY MEDICINE
Payer: MEDICARE

## 2024-04-03 ENCOUNTER — APPOINTMENT (OUTPATIENT)
Dept: RADIOLOGY | Facility: MEDICAL CENTER | Age: 64
DRG: 192 | End: 2024-04-03
Attending: BEHAVIOR ANALYST
Payer: MEDICARE

## 2024-04-03 ENCOUNTER — APPOINTMENT (OUTPATIENT)
Dept: CARDIOLOGY | Facility: MEDICAL CENTER | Age: 64
DRG: 192 | End: 2024-04-03
Attending: BEHAVIOR ANALYST
Payer: MEDICARE

## 2024-04-03 DIAGNOSIS — R79.89 ELEVATED TROPONIN: ICD-10-CM

## 2024-04-03 DIAGNOSIS — R06.00 DYSPNEA, UNSPECIFIED TYPE: ICD-10-CM

## 2024-04-03 PROBLEM — R07.9 CHEST PAIN: Status: ACTIVE | Noted: 2024-04-03

## 2024-04-03 PROBLEM — J44.1 COPD EXACERBATION (HCC): Status: ACTIVE | Noted: 2024-04-03

## 2024-04-03 PROBLEM — E78.5 HLD (HYPERLIPIDEMIA): Status: ACTIVE | Noted: 2024-04-03

## 2024-04-03 PROBLEM — F99 MENTAL HEALTH DISORDER: Status: ACTIVE | Noted: 2024-04-03

## 2024-04-03 PROBLEM — I10 HTN (HYPERTENSION): Status: ACTIVE | Noted: 2024-04-03

## 2024-04-03 LAB
ALBUMIN SERPL BCP-MCNC: 4.1 G/DL (ref 3.2–4.9)
ALBUMIN/GLOB SERPL: 1.4 G/DL
ALP SERPL-CCNC: 106 U/L (ref 30–99)
ALT SERPL-CCNC: 46 U/L (ref 2–50)
ANION GAP SERPL CALC-SCNC: 13 MMOL/L (ref 7–16)
AST SERPL-CCNC: 39 U/L (ref 12–45)
BASOPHILS # BLD AUTO: 0.3 % (ref 0–1.8)
BASOPHILS # BLD: 0.03 K/UL (ref 0–0.12)
BILIRUB SERPL-MCNC: 0.2 MG/DL (ref 0.1–1.5)
BUN SERPL-MCNC: 15 MG/DL (ref 8–22)
CALCIUM ALBUM COR SERPL-MCNC: 9.2 MG/DL (ref 8.5–10.5)
CALCIUM SERPL-MCNC: 9.3 MG/DL (ref 8.5–10.5)
CHLORIDE SERPL-SCNC: 106 MMOL/L (ref 96–112)
CO2 SERPL-SCNC: 22 MMOL/L (ref 20–33)
CREAT SERPL-MCNC: 0.83 MG/DL (ref 0.5–1.4)
EKG IMPRESSION: NORMAL
EOSINOPHIL # BLD AUTO: 0.07 K/UL (ref 0–0.51)
EOSINOPHIL NFR BLD: 0.7 % (ref 0–6.9)
ERYTHROCYTE [DISTWIDTH] IN BLOOD BY AUTOMATED COUNT: 43.8 FL (ref 35.9–50)
GFR SERPLBLD CREATININE-BSD FMLA CKD-EPI: 78 ML/MIN/1.73 M 2
GLOBULIN SER CALC-MCNC: 3 G/DL (ref 1.9–3.5)
GLUCOSE SERPL-MCNC: 114 MG/DL (ref 65–99)
HCT VFR BLD AUTO: 39.4 % (ref 37–47)
HGB BLD-MCNC: 13.4 G/DL (ref 12–16)
IMM GRANULOCYTES # BLD AUTO: 0.02 K/UL (ref 0–0.11)
IMM GRANULOCYTES NFR BLD AUTO: 0.2 % (ref 0–0.9)
INR PPP: 1.21 (ref 0.87–1.13)
LYMPHOCYTES # BLD AUTO: 2.49 K/UL (ref 1–4.8)
LYMPHOCYTES NFR BLD: 26.2 % (ref 22–41)
MCH RBC QN AUTO: 30.2 PG (ref 27–33)
MCHC RBC AUTO-ENTMCNC: 34 G/DL (ref 32.2–35.5)
MCV RBC AUTO: 88.7 FL (ref 81.4–97.8)
MONOCYTES # BLD AUTO: 0.59 K/UL (ref 0–0.85)
MONOCYTES NFR BLD AUTO: 6.2 % (ref 0–13.4)
NEUTROPHILS # BLD AUTO: 6.3 K/UL (ref 1.82–7.42)
NEUTROPHILS NFR BLD: 66.4 % (ref 44–72)
NRBC # BLD AUTO: 0 K/UL
NRBC BLD-RTO: 0 /100 WBC (ref 0–0.2)
PLATELET # BLD AUTO: 232 K/UL (ref 164–446)
PMV BLD AUTO: 9.6 FL (ref 9–12.9)
POTASSIUM SERPL-SCNC: 4.3 MMOL/L (ref 3.6–5.5)
PROT SERPL-MCNC: 7.1 G/DL (ref 6–8.2)
PROTHROMBIN TIME: 15.5 SEC (ref 12–14.6)
RBC # BLD AUTO: 4.44 M/UL (ref 4.2–5.4)
SODIUM SERPL-SCNC: 141 MMOL/L (ref 135–145)
TROPONIN T SERPL-MCNC: 241 NG/L (ref 6–19)
UFH PPP CHRO-ACNC: >1.1 IU/ML
WBC # BLD AUTO: 9.5 K/UL (ref 4.8–10.8)

## 2024-04-03 PROCEDURE — 36415 COLL VENOUS BLD VENIPUNCTURE: CPT

## 2024-04-03 PROCEDURE — 99222 1ST HOSP IP/OBS MODERATE 55: CPT | Mod: AI,GC | Performed by: FAMILY MEDICINE

## 2024-04-03 PROCEDURE — 700102 HCHG RX REV CODE 250 W/ 637 OVERRIDE(OP)

## 2024-04-03 PROCEDURE — 700102 HCHG RX REV CODE 250 W/ 637 OVERRIDE(OP): Performed by: BEHAVIOR ANALYST

## 2024-04-03 PROCEDURE — 71045 X-RAY EXAM CHEST 1 VIEW: CPT

## 2024-04-03 PROCEDURE — 93306 TTE W/DOPPLER COMPLETE: CPT

## 2024-04-03 PROCEDURE — 94640 AIRWAY INHALATION TREATMENT: CPT

## 2024-04-03 PROCEDURE — 99285 EMERGENCY DEPT VISIT HI MDM: CPT

## 2024-04-03 PROCEDURE — 93005 ELECTROCARDIOGRAM TRACING: CPT

## 2024-04-03 PROCEDURE — 85610 PROTHROMBIN TIME: CPT

## 2024-04-03 PROCEDURE — 700111 HCHG RX REV CODE 636 W/ 250 OVERRIDE (IP): Performed by: BEHAVIOR ANALYST

## 2024-04-03 PROCEDURE — 700111 HCHG RX REV CODE 636 W/ 250 OVERRIDE (IP): Performed by: INTERNAL MEDICINE

## 2024-04-03 PROCEDURE — 99222 1ST HOSP IP/OBS MODERATE 55: CPT | Performed by: INTERNAL MEDICINE

## 2024-04-03 PROCEDURE — 85520 HEPARIN ASSAY: CPT

## 2024-04-03 PROCEDURE — A9270 NON-COVERED ITEM OR SERVICE: HCPCS

## 2024-04-03 PROCEDURE — A9270 NON-COVERED ITEM OR SERVICE: HCPCS | Mod: UD | Performed by: EMERGENCY MEDICINE

## 2024-04-03 PROCEDURE — 770020 HCHG ROOM/CARE - TELE (206)

## 2024-04-03 PROCEDURE — 700102 HCHG RX REV CODE 250 W/ 637 OVERRIDE(OP): Mod: UD | Performed by: EMERGENCY MEDICINE

## 2024-04-03 PROCEDURE — 93005 ELECTROCARDIOGRAM TRACING: CPT | Performed by: EMERGENCY MEDICINE

## 2024-04-03 PROCEDURE — A9270 NON-COVERED ITEM OR SERVICE: HCPCS | Performed by: BEHAVIOR ANALYST

## 2024-04-03 PROCEDURE — 80053 COMPREHEN METABOLIC PANEL: CPT

## 2024-04-03 PROCEDURE — 700101 HCHG RX REV CODE 250: Performed by: BEHAVIOR ANALYST

## 2024-04-03 PROCEDURE — 84484 ASSAY OF TROPONIN QUANT: CPT

## 2024-04-03 PROCEDURE — 85025 COMPLETE CBC W/AUTO DIFF WBC: CPT

## 2024-04-03 RX ORDER — ONDANSETRON 4 MG/1
4 TABLET, ORALLY DISINTEGRATING ORAL EVERY 4 HOURS PRN
Status: DISCONTINUED | OUTPATIENT
Start: 2024-04-03 | End: 2024-04-05 | Stop reason: HOSPADM

## 2024-04-03 RX ORDER — ASPIRIN 325 MG
325 TABLET ORAL ONCE
Status: COMPLETED | OUTPATIENT
Start: 2024-04-03 | End: 2024-04-03

## 2024-04-03 RX ORDER — HEPARIN SODIUM 5000 [USP'U]/100ML
INJECTION, SOLUTION INTRAVENOUS CONTINUOUS
Status: DISCONTINUED | OUTPATIENT
Start: 2024-04-03 | End: 2024-04-04

## 2024-04-03 RX ORDER — LABETALOL HYDROCHLORIDE 5 MG/ML
10 INJECTION, SOLUTION INTRAVENOUS EVERY 4 HOURS PRN
Status: DISCONTINUED | OUTPATIENT
Start: 2024-04-03 | End: 2024-04-05 | Stop reason: HOSPADM

## 2024-04-03 RX ORDER — PROCHLORPERAZINE EDISYLATE 5 MG/ML
5-10 INJECTION INTRAMUSCULAR; INTRAVENOUS EVERY 4 HOURS PRN
Status: DISCONTINUED | OUTPATIENT
Start: 2024-04-03 | End: 2024-04-05 | Stop reason: HOSPADM

## 2024-04-03 RX ORDER — DULOXETIN HYDROCHLORIDE 60 MG/1
60 CAPSULE, DELAYED RELEASE ORAL DAILY
Status: DISCONTINUED | OUTPATIENT
Start: 2024-04-04 | End: 2024-04-05 | Stop reason: HOSPADM

## 2024-04-03 RX ORDER — HEPARIN SODIUM 1000 [USP'U]/ML
30 INJECTION, SOLUTION INTRAVENOUS; SUBCUTANEOUS PRN
Status: DISCONTINUED | OUTPATIENT
Start: 2024-04-03 | End: 2024-04-03

## 2024-04-03 RX ORDER — HEPARIN SODIUM 5000 [USP'U]/100ML
INJECTION, SOLUTION INTRAVENOUS CONTINUOUS
Status: DISCONTINUED | OUTPATIENT
Start: 2024-04-03 | End: 2024-04-03

## 2024-04-03 RX ORDER — PREGABALIN 100 MG/1
200 CAPSULE ORAL 3 TIMES DAILY
Status: DISCONTINUED | OUTPATIENT
Start: 2024-04-03 | End: 2024-04-05 | Stop reason: HOSPADM

## 2024-04-03 RX ORDER — ALBUTEROL SULFATE 90 UG/1
2-3 AEROSOL, METERED RESPIRATORY (INHALATION) EVERY 6 HOURS PRN
Status: DISCONTINUED | OUTPATIENT
Start: 2024-04-03 | End: 2024-04-05 | Stop reason: HOSPADM

## 2024-04-03 RX ORDER — HEPARIN SODIUM 5000 [USP'U]/100ML
0-30 INJECTION, SOLUTION INTRAVENOUS CONTINUOUS
Status: DISCONTINUED | OUTPATIENT
Start: 2024-04-03 | End: 2024-04-03

## 2024-04-03 RX ORDER — ACETAMINOPHEN 325 MG/1
650 TABLET ORAL EVERY 6 HOURS PRN
Status: DISCONTINUED | OUTPATIENT
Start: 2024-04-03 | End: 2024-04-05 | Stop reason: HOSPADM

## 2024-04-03 RX ORDER — HEPARIN SODIUM 1000 [USP'U]/ML
2600 INJECTION, SOLUTION INTRAVENOUS; SUBCUTANEOUS PRN
Status: DISCONTINUED | OUTPATIENT
Start: 2024-04-03 | End: 2024-04-03

## 2024-04-03 RX ORDER — HEPARIN SODIUM 1000 [USP'U]/ML
2600 INJECTION, SOLUTION INTRAVENOUS; SUBCUTANEOUS PRN
Status: DISCONTINUED | OUTPATIENT
Start: 2024-04-03 | End: 2024-04-04

## 2024-04-03 RX ORDER — MIRTAZAPINE 15 MG/1
15 TABLET, FILM COATED ORAL
Status: DISCONTINUED | OUTPATIENT
Start: 2024-04-03 | End: 2024-04-05 | Stop reason: HOSPADM

## 2024-04-03 RX ORDER — IPRATROPIUM BROMIDE AND ALBUTEROL SULFATE 2.5; .5 MG/3ML; MG/3ML
3 SOLUTION RESPIRATORY (INHALATION)
Status: DISCONTINUED | OUTPATIENT
Start: 2024-04-03 | End: 2024-04-05 | Stop reason: HOSPADM

## 2024-04-03 RX ORDER — AMOXICILLIN 250 MG
2 CAPSULE ORAL EVERY EVENING
Status: DISCONTINUED | OUTPATIENT
Start: 2024-04-03 | End: 2024-04-05 | Stop reason: HOSPADM

## 2024-04-03 RX ORDER — ONDANSETRON 2 MG/ML
4 INJECTION INTRAMUSCULAR; INTRAVENOUS EVERY 4 HOURS PRN
Status: DISCONTINUED | OUTPATIENT
Start: 2024-04-03 | End: 2024-04-05 | Stop reason: HOSPADM

## 2024-04-03 RX ORDER — PREDNISONE 20 MG/1
40 TABLET ORAL DAILY
Status: DISCONTINUED | OUTPATIENT
Start: 2024-04-03 | End: 2024-04-05 | Stop reason: HOSPADM

## 2024-04-03 RX ORDER — POLYETHYLENE GLYCOL 3350 17 G/17G
1 POWDER, FOR SOLUTION ORAL
Status: DISCONTINUED | OUTPATIENT
Start: 2024-04-03 | End: 2024-04-05 | Stop reason: HOSPADM

## 2024-04-03 RX ORDER — PROMETHAZINE HYDROCHLORIDE 25 MG/1
12.5-25 TABLET ORAL EVERY 4 HOURS PRN
Status: DISCONTINUED | OUTPATIENT
Start: 2024-04-03 | End: 2024-04-05 | Stop reason: HOSPADM

## 2024-04-03 RX ORDER — PROMETHAZINE HYDROCHLORIDE 25 MG/1
12.5-25 SUPPOSITORY RECTAL EVERY 4 HOURS PRN
Status: DISCONTINUED | OUTPATIENT
Start: 2024-04-03 | End: 2024-04-05 | Stop reason: HOSPADM

## 2024-04-03 RX ORDER — IPRATROPIUM BROMIDE AND ALBUTEROL SULFATE 2.5; .5 MG/3ML; MG/3ML
3 SOLUTION RESPIRATORY (INHALATION) ONCE
Status: COMPLETED | OUTPATIENT
Start: 2024-04-03 | End: 2024-04-03

## 2024-04-03 RX ORDER — ATORVASTATIN CALCIUM 40 MG/1
40 TABLET, FILM COATED ORAL DAILY
Status: DISCONTINUED | OUTPATIENT
Start: 2024-04-04 | End: 2024-04-05 | Stop reason: HOSPADM

## 2024-04-03 RX ADMIN — DOCUSATE SODIUM 50 MG AND SENNOSIDES 8.6 MG 2 TABLET: 8.6; 5 TABLET, FILM COATED ORAL at 18:11

## 2024-04-03 RX ADMIN — PREGABALIN 200 MG: 100 CAPSULE ORAL at 18:10

## 2024-04-03 RX ADMIN — MIRTAZAPINE 15 MG: 15 TABLET, FILM COATED ORAL at 23:24

## 2024-04-03 RX ADMIN — IPRATROPIUM BROMIDE AND ALBUTEROL SULFATE 3 ML: 2.5; .5 SOLUTION RESPIRATORY (INHALATION) at 16:56

## 2024-04-03 RX ADMIN — METOPROLOL TARTRATE 25 MG: 25 TABLET, FILM COATED ORAL at 18:10

## 2024-04-03 RX ADMIN — ASPIRIN 325 MG: 325 TABLET ORAL at 12:57

## 2024-04-03 RX ADMIN — PREDNISONE 40 MG: 20 TABLET ORAL at 18:11

## 2024-04-03 RX ADMIN — HEPARIN SODIUM 1050 UNITS/HR: 5000 INJECTION, SOLUTION INTRAVENOUS at 18:44

## 2024-04-03 ASSESSMENT — ENCOUNTER SYMPTOMS
COUGH: 0
DIAPHORESIS: 0
HEMATOCHEZIA: 0
FEVER: 0
WHEEZING: 1
HEMOPTYSIS: 0

## 2024-04-03 ASSESSMENT — LIFESTYLE VARIABLES
HAVE PEOPLE ANNOYED YOU BY CRITICIZING YOUR DRINKING: NO
CONSUMPTION TOTAL: NEGATIVE
ALCOHOL_USE: NO
ON A TYPICAL DAY WHEN YOU DRINK ALCOHOL HOW MANY DRINKS DO YOU HAVE: 0
TOTAL SCORE: 0
TOTAL SCORE: 0
ON A TYPICAL DAY WHEN YOU DRINK ALCOHOL HOW MANY DRINKS DO YOU HAVE: 0
HAVE PEOPLE ANNOYED YOU BY CRITICIZING YOUR DRINKING: NO
AVERAGE NUMBER OF DAYS PER WEEK YOU HAVE A DRINK CONTAINING ALCOHOL: 0
EVER FELT BAD OR GUILTY ABOUT YOUR DRINKING: NO
EVER FELT BAD OR GUILTY ABOUT YOUR DRINKING: NO
HAVE YOU EVER FELT YOU SHOULD CUT DOWN ON YOUR DRINKING: NO
TOTAL SCORE: 0
DO YOU DRINK ALCOHOL: NO
HAVE YOU EVER FELT YOU SHOULD CUT DOWN ON YOUR DRINKING: NO
DOES PATIENT WANT TO STOP DRINKING: NO
TOTAL SCORE: 0
HOW MANY TIMES IN THE PAST YEAR HAVE YOU HAD 5 OR MORE DRINKS IN A DAY: 0
CONSUMPTION TOTAL: NEGATIVE
EVER HAD A DRINK FIRST THING IN THE MORNING TO STEADY YOUR NERVES TO GET RID OF A HANGOVER: NO
TOTAL SCORE: 0
HOW MANY TIMES IN THE PAST YEAR HAVE YOU HAD 5 OR MORE DRINKS IN A DAY: 0
TOTAL SCORE: 0
EVER HAD A DRINK FIRST THING IN THE MORNING TO STEADY YOUR NERVES TO GET RID OF A HANGOVER: NO
AVERAGE NUMBER OF DAYS PER WEEK YOU HAVE A DRINK CONTAINING ALCOHOL: 0

## 2024-04-03 ASSESSMENT — COGNITIVE AND FUNCTIONAL STATUS - GENERAL
SUGGESTED CMS G CODE MODIFIER MOBILITY: CH
MOBILITY SCORE: 24
SUGGESTED CMS G CODE MODIFIER MOBILITY: CK
TURNING FROM BACK TO SIDE WHILE IN FLAT BAD: A LITTLE
SUGGESTED CMS G CODE MODIFIER DAILY ACTIVITY: CJ
MOVING TO AND FROM BED TO CHAIR: A LITTLE
DAILY ACTIVITIY SCORE: 24
TOILETING: A LITTLE
SUGGESTED CMS G CODE MODIFIER DAILY ACTIVITY: CH
DAILY ACTIVITIY SCORE: 20
MOBILITY SCORE: 18
WALKING IN HOSPITAL ROOM: A LITTLE
STANDING UP FROM CHAIR USING ARMS: A LITTLE
CLIMB 3 TO 5 STEPS WITH RAILING: A LITTLE
DRESSING REGULAR UPPER BODY CLOTHING: A LITTLE
MOVING FROM LYING ON BACK TO SITTING ON SIDE OF FLAT BED: A LITTLE
HELP NEEDED FOR BATHING: A LITTLE
DRESSING REGULAR LOWER BODY CLOTHING: A LITTLE

## 2024-04-03 ASSESSMENT — PAIN DESCRIPTION - PAIN TYPE
TYPE: ACUTE PAIN

## 2024-04-03 ASSESSMENT — PATIENT HEALTH QUESTIONNAIRE - PHQ9
1. LITTLE INTEREST OR PLEASURE IN DOING THINGS: NOT AT ALL
2. FEELING DOWN, DEPRESSED, IRRITABLE, OR HOPELESS: NOT AT ALL
SUM OF ALL RESPONSES TO PHQ9 QUESTIONS 1 AND 2: 0

## 2024-04-03 ASSESSMENT — FIBROSIS 4 INDEX
FIB4 SCORE: 1.59

## 2024-04-03 NOTE — ED NOTES
Lab from Lab called with critical result of Troponin 241 at 1250. Critical lab result read back to Lab.   Dr. Ivan notified of critical lab result at 1251.  Critical lab result read back by Dr. Ivan.

## 2024-04-03 NOTE — ED TRIAGE NOTES
"Chief Complaint   Patient presents with    Shortness of Breath     Pt woke this morning at 3:00 am with SOB. Pt was brought to hospital by police  and pt was found to have elevated troponin at 141 with repeat of 533. Pt denies SOB at this tie, chest pain or nausea.          Pt transferred by EMS from [facility] for above complaint. Pt given duo neb tx in the ER at [place]. Pt takes xarelto for clotting disorder and was not started on heparin. Pt A+Ox4, on RA and denying any symptoms at this time.,       /83   Pulse 72   Temp 36.6 °C (97.8 °F) (Temporal)   Resp 18   Ht 1.626 m (5' 4\")   Wt 80.7 kg (178 lb)   SpO2 95%     "

## 2024-04-03 NOTE — ED NOTES
Requested clarification of orders for heparin gtt from Dr. Singer,.  Nursing communication says to start heparin gtt at 1900, but MAR says 1500.  Awaiting reply.

## 2024-04-03 NOTE — ED NOTES
Med rec partially complete per Pt.   [facility] closed for lunch. Need to verify Potassium strength.  Family/friend/caregiver present at time of interview with permission from Patient.    Allergies reviewed.    Has patient had any outside antibiotics in the last 30 days? n    Any Anticoagulants (rivaroxaban, apixaban, edoxaban, dabigatran, warfarin, enoxaparin) taken in the last 14 days? Y Xarelyo last dose: 4/2/24 @07:00           Pharmacy/Pharmacies Pt utilizes : [facility]

## 2024-04-03 NOTE — CONSULTS
Cardiology Initial Consult Note    Date of note:    4/3/2024      Consulting Physician: Umer Ivan, *    Name:   Iesha Swanson   YOB: 1960  Age:   63 y.o.  female   MRN:   [id number]    Reason for Consultation: Elevated troponin, shortness of breath    HPI:  Iesha Swanson is a 63 y.o. female history of DVT on Xarelto, hypertension on metoprolol tartrate, history of smoking, currently does not smoke but does vape marijuana, who presented to outside emergency room with complaints of sudden onset of shortness of breath around 3-4 this morning.  She does have a history of breathing issues it sounds as she does use inhalers at home.    Patient reports no known cardiac history.  For the past 6 weeks, she has been feeling shortness of breath even at rest, but more with exertion.  She has been using her breathing treatments which seems to help temporarily but it would come back.  It has progressively gotten worse as the weeks have gone on.  She also has reported some chest tightness along with her breathing, however that only started a few days ago.  This morning she either was awoken with more shortness of breath than usual or she was going to the bathroom and noted significant shortness of breath which is what prompted her to seek medical attention.    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  Currently, she feels short of breath, chest pressure is less prominent.    Outside troponin initially 141, repeat 533.  Troponin here 241    Problem list:  Active Problems:    * No active hospital problems. *  Resolved Problems:    * No resolved hospital problems. *      Past Medical History:   Diagnosis Date    Depression     DVT (deep venous thrombosis) ([facility])        History reviewed. No pertinent surgical history.      (Not in a hospital admission)    No current facility-administered medications for this encounter.     Current Outpatient  Medications   Medication Sig Dispense Refill    B Complex Vitamins (B COMPLEX PO) Take 1 Tablet by mouth every day.      MAGNESIUM PO Take 1 Tablet by mouth every day.      POTASSIUM PO Take 1 Tablet by mouth 2 times a day.      metoprolol tartrate (LOPRESSOR) 25 MG Tab Take 25 mg by mouth 2 times a day.      XARELTO 20 MG Tab tablet Take 1 Tablet by mouth with dinner. 90 Tablet 3    PROAIR  (90 Base) MCG/ACT Aero Soln inhalation aerosol Inhale 2-3 Puffs every 6 hours as needed for Shortness of Breath.      atorvastatin (LIPITOR) 40 MG Tab Take 40 mg by mouth every day.      DULoxetine (CYMBALTA) 60 MG Cap DR Particles delayed-release capsule Take 60 mg by mouth every day.      fluticasone (FLONASE) 50 MCG/ACT nasal spray Administer 2 Sprays into affected nostril(S) every day.      pregabalin (LYRICA) 200 MG capsule Take 200 mg by mouth in the morning, at noon, and at bedtime.         No Known Allergies    History reviewed. No pertinent family history.    Social History     Socioeconomic History    Marital status:      Spouse name: Not on file    Number of children: Not on file    Years of education: Not on file    Highest education level: Not on file   Occupational History    Not on file   Tobacco Use    Smoking status: Never    Smokeless tobacco: Never   Substance and Sexual Activity    Alcohol use: Yes    Drug use: No    Sexual activity: Not on file   Other Topics Concern    Not on file   Social History Narrative    Not on file     Social Determinants of Health     Financial Resource Strain: Not on file   Food Insecurity: Not on file   Transportation Needs: Not on file   Physical Activity: Not on file   Stress: Not on file   Social Connections: Not on file   Intimate Partner Violence: Not on file   Housing Stability: Not on file       No intake or output data in the 24 hours ending 04/03/24 1325      Review of Systems   Constitutional: Negative for diaphoresis and fever.   Respiratory:  Positive for  "wheezing. Negative for cough and hemoptysis.    Gastrointestinal:  Negative for hematochezia and melena.   Genitourinary:  Negative for hematuria.        Physical Exam  Body mass index is 30.55 kg/m².  /83   Pulse 72   Temp 36.6 °C (97.8 °F) (Temporal)   Resp 18   Ht 1.626 m (5' 4\")   Wt 80.7 kg (178 lb)   SpO2 95%   Vitals:    04/03/24 1143 04/03/24 1144 04/03/24 1146 04/03/24 1149   BP: 128/83  128/83    Pulse: 76 74 72    Resp:   18    Temp:   36.6 °C (97.8 °F)    TempSrc:   Temporal    SpO2:    95%   Weight:       Height:         Oxygen Therapy:  Pulse Oximetry: 95 %, O2 (LPM): 0, O2 Delivery Device: None - Room Air    Physical Exam  Constitutional:       Appearance: Normal appearance.   Eyes:      Extraocular Movements: Extraocular movements intact.      Conjunctiva/sclera: Conjunctivae normal.   Neck:      Vascular: No carotid bruit or JVD.   Cardiovascular:      Rate and Rhythm: Normal rate and regular rhythm.      Pulses:           Radial pulses are 2+ on the right side and 2+ on the left side.        Posterior tibial pulses are 1+ on the right side and 1+ on the left side.      Heart sounds: Heart sounds are distant. No murmur heard.     No friction rub. No gallop.   Pulmonary:      Effort: Pulmonary effort is normal. No respiratory distress.      Breath sounds: Normal breath sounds. No wheezing.   Abdominal:      General: Bowel sounds are normal.      Palpations: Abdomen is soft.   Musculoskeletal:      Cervical back: Neck supple.      Right lower leg: No edema.      Left lower leg: No edema.   Skin:     General: Skin is warm and dry.   Neurological:      Mental Status: She is alert and oriented to person, place, and time. Mental status is at baseline.   Psychiatric:         Mood and Affect: Mood normal.          Labs (personally reviewed and notable for):   Lab Results   Component Value Date/Time    SODIUM 141 04/03/2024 12:12 PM    POTASSIUM 4.3 04/03/2024 12:12 PM    CHLORIDE 106 04/03/2024 " "12:12 PM    CO2 22 2024 12:12 PM    GLUCOSE 114 (H) 2024 12:12 PM    BUN 15 2024 12:12 PM    CREATININE 0.83 2024 12:12 PM      Lab Results   Component Value Date/Time    WBC 9.5 2024 12:12 PM    RBC 4.44 2024 12:12 PM    HEMOGLOBIN 13.4 2024 12:12 PM    HEMATOCRIT 39.4 2024 12:12 PM    MCV 88.7 2024 12:12 PM    MCH 30.2 2024 12:12 PM    MCHC 34.0 2024 12:12 PM    MPV 9.6 2024 12:12 PM    NEUTSPOLYS 66.40 2024 12:12 PM    LYMPHOCYTES 26.20 2024 12:12 PM    MONOCYTES 6.20 2024 12:12 PM    EOSINOPHILS 0.70 2024 12:12 PM    BASOPHILS 0.30 2024 12:12 PM    INR 1.21 (H) 2024 12:12 PM      No results found for: \"CHOLSTRLTOT\", \"LDL\", \"HDL\", \"TRIGLYCERIDE\"    Lab Results   Component Value Date/Time    TROPONINT 241 (H) 2024 1212     No results found for: \"NTPROBNP\"  No results found for: \"HBA1C\"    Cardiac Imaging and Procedures Review:    EKG dated 2024: My personal interpretation is sinus rhythm, 73 bpm, nonspecific T wave inversion in V1 and V2, these are new compared to prior ECG of 2021    Nuclear Perfusion Imaging 10/1/2021: No evidence of ischemia or infarction with normal LVEF and wall motion.    Radiology test Review:  No orders to display     Impression and Medical Decision Makin.  Non-ST elevation MI, initial troponin here is 241.  Her predominant symptom is shortness of breath and dyspnea on exertion, however she has started to have some chest pressure and was awoken suddenly with shortness of breath this morning.  ECG shows no acute changes just some T wave inversions in V1 and V2.  Her last dose of Xarelto was 2024.  Discussed recommendations for cardiac catheterization with possible percutaneous coronary mention with the patient.The risks, benefits, and alternatives to coronary angiography with possible percutaneous coronary intervention and IV sedation were discussed in great " detail. Specific risks mentioned include bleeding that may require blood transfusion, kidney damage from IV contrast allergy, infection, allergic reaction, arterial damage that may require intervention or surgery, cardiac perforation with possible tamponade requiring yarely-cardiocentesis or possible open heart surgery. We also discussed in great detail a stent is placed, they will have to be on two antiplatelet agents (blood thinners) for up to 1 year.  Verified with patient no planned surgeries/procedures are planned within the next year.  Verified with patient no recent bleeding.  In addition, we discussed that 10% of patients will experience small to moderate bruising at the side of the arterial puncture. Lastly the risks of heart attack, stroke, and death were discussed; the risks of major complications such as heart attack or stroke caused by the angiogram is less than 1%; the risk of death is approximately 1 in 1000. The patient verbalized understanding of these potential complications and wishes to proceed with this procedure.  -N.p.o. after midnight  - Cardiac catheterization with possible percutaneous coronary mention on 4/4/2024  - Continue to hold Xarelto  - Start heparin drip cardiac dosing  - Enteric-coated aspirin 81 mg p.o. daily  - Continue with home dose of atorvastatin 40 mg p.o. nightly, but check fasting lipid profile  - Check echocardiogram    2.  Lungs does have some wheezing.  She may have COPD/reactive airway disease will defer to primary treating team.  If nebulizers are needed, should be fine to have it.      Thank you for allowing me to participate in the care of this patient.  Please contact me with any questions.      Vivien Martinez M.D.  Cardiologist, [facility] [place]     This note was generated using voice recognition software which has a small chance of producing errors of grammar and possibly content. I have made every reasonable attempt to find and correct any  obvious errors, but expect that some may not be found prior to finalization of this note.

## 2024-04-03 NOTE — ED PROVIDER NOTES
CHIEF COMPLAINT  Chief Complaint   Patient presents with    Shortness of Breath     Pt woke this morning at 3:00 am with SOB. Pt was brought to hospital by police  and pt was found to have elevated troponin at 141 with repeat of 533. Pt denies SOB at this tie, chest pain or nausea.        LIMITATION TO HISTORY   None    HPI    Iesha Swanson is a 63 y.o. female   Sent here for elevated troponins.  6:30 AM troponin was approximately 141  Repeated 1 was approximately 533.  Ranges 4-20 is their range.    Patient states that she had some sinus shortness of breath.  It occurred this morning.  Around 2 or 3.  Patient states that it may have been occurred because she been under a lot of stress with her  who is a chronic opiate abuser had been on for the last 2 days.  He is on this for 20 years and she is used to it    However she does have a history complicated COPD and she was inhaler so over the last year she been more short of breath chasing her dogs and doing other things.    Reviewed the notes like the patient try to get established in Jan July with cardiology but had not been seen for them for quite some time    She does have history of smoking, cholesterol, hypertension, borderline diabetes    She comes in today for elevated troponins.  She has no shortness of breath this time no chest pain.    Is a history of DVT she takes Xarelto.  Has been since age 17.    OUTSIDE HISTORIAN(S):  Family is at the bedside.    EXTERNAL RECORDS REVIEWED       2021 had a stress test that revealed no acute abnormalities     Myocardial Perfusion   Report   NUCLEAR IMAGING INTERPRETATION   No evidence of significant jeopardized viable myocardium or prior myocardial    infarction.   Normal left ventricular size, ejection fraction, and wall motion.   ECG INTERPRETATION   Negative stress ECG for ischemia.    Noted chart shows that on July 25 she wanted medications but the patient at this point was not seeing her  doctor there so not refilled    LL    7/25/23 11:59 AM  Note     Denied refill request due to patient needs appointment for further refills.             REVIEW OF SYSTEMS  See above    PAST MEDICAL HISTORY  Past Medical History:   Diagnosis Date    Depression     DVT (deep venous thrombosis) ([facility])        FAMILY HISTORY  History reviewed. No pertinent family history.    SOCIAL HISTORY  Social History     Tobacco Use    Smoking status: Never    Smokeless tobacco: Never   Substance Use Topics    Alcohol use: Yes    Drug use: No     Social History     Substance and Sexual Activity   Drug Use No       SURGICAL HISTORY  History reviewed. No pertinent surgical history.    CURRENT MEDICATIONS  No current facility-administered medications for this encounter.    Current Outpatient Medications:     cilostazol (PLETAL) 100 MG Tab, Take 1 Tablet by mouth 2 times a day., Disp: 180 Tablet, Rfl: 0    metoprolol tartrate (LOPRESSOR) 25 MG Tab, Take 25 mg by mouth 2 times a day., Disp: , Rfl:     XARELTO 20 MG Tab tablet, Take 1 Tablet by mouth with dinner., Disp: 90 Tablet, Rfl: 3    PROAIR  (90 Base) MCG/ACT Aero Soln inhalation aerosol, , Disp: , Rfl:     atorvastatin (LIPITOR) 40 MG Tab, , Disp: , Rfl:     busPIRone (BUSPAR) 30 MG tablet, Take 30 mg by mouth., Disp: , Rfl:     DULoxetine (CYMBALTA) 60 MG Cap DR Particles delayed-release capsule, , Disp: , Rfl:     fluticasone (FLONASE) 50 MCG/ACT nasal spray, , Disp: , Rfl:     fluticasone-salmeterol (ADVAIR) 250-50 MCG/DOSE AEROSOL POWDER, BREATH ACTIVATED, , Disp: , Rfl:     pantoprazole (PROTONIX) 40 MG Tablet Delayed Response, , Disp: , Rfl:     pregabalin (LYRICA) 150 MG Cap, , Disp: , Rfl:     SPIRIVA HANDIHALER 18 MCG Cap, , Disp: , Rfl:     aspirin (ASA) 81 MG Chew Tab chewable tablet, Chew 81 mg every day., Disp: , Rfl:     ALLERGIES  No Known Allergies    PHYSICAL EXAM  VITAL SIGNS: /83   Pulse 72   Temp 36.6 °C (97.8 °F) (Temporal)   Resp 18   Ht 1.626  "m (5' 4\")   Wt 80.7 kg (178 lb)   SpO2 95%   BMI 30.55 kg/m²   Reviewed and noted  Constitutional: Well developed, Well nourished, cute distress.  HENT: Normocephalic, atraumatic, bilateral external ears normal, No intraoral erythema, edema, exudate  Eyes: PERRLA, conjunctiva pink, no scleral icterus.   Cardiovascular: Regular rate and rhythm. No murmurs, rubs or gallops.  No dependent edema or calf tenderness  Respiratory: Lungs clear to auscultation bilaterally. No wheezes, rales, or rhonchi.  Abdominal:  Abdomen soft, non-tender, non distended. No rebound, or guarding.    Skin: No erythema, no rash. No wounds or bruising.  Genitourinary: No costovertebral angle tenderness.   Musculoskeletal: no deformities.   Neurologic: Alert, no facial droop noted. All extra ocular muscles intact. Moves all extremities with out weakness noted  Psychiatric: Affect normal, Judgment normal, Mood normal.         MEDICAL DECISION MAKING:  PROBLEMS EVALUATED THIS VISIT:  Elevated troponin.  EKG here shows no acute ischemia.  Patient is chest pain-free no shortness of breath.  They have trended positive.  The patient has a history of negative stress test in 2021.  Has a history of dyspnea that sudden onset but also has chronic dyspnea secondary to COPD.         PLAN:  Mid  Repeat troponin  Call cardiology  Call hospitalist  Aspirin.    RISK:  Is at high risk of being admitted to the hospital.  RESULTS    LABS Ordered and Reviewed by Me:  Results for orders placed or performed during the hospital encounter of 04/03/24   CBC w/ Differential   Result Value Ref Range    WBC 9.5 4.8 - 10.8 K/uL    RBC 4.44 4.20 - 5.40 M/uL    Hemoglobin 13.4 12.0 - 16.0 g/dL    Hematocrit 39.4 37.0 - 47.0 %    MCV 88.7 81.4 - 97.8 fL    MCH 30.2 27.0 - 33.0 pg    MCHC 34.0 32.2 - 35.5 g/dL    RDW 43.8 35.9 - 50.0 fL    Platelet Count 232 164 - 446 K/uL    MPV 9.6 9.0 - 12.9 fL    Neutrophils-Polys 66.40 44.00 - 72.00 %    Lymphocytes 26.20 22.00 - 41.00 " %    Monocytes 6.20 0.00 - 13.40 %    Eosinophils 0.70 0.00 - 6.90 %    Basophils 0.30 0.00 - 1.80 %    Immature Granulocytes 0.20 0.00 - 0.90 %    Nucleated RBC 0.00 0.00 - 0.20 /100 WBC    Neutrophils (Absolute) 6.30 1.82 - 7.42 K/uL    Lymphs (Absolute) 2.49 1.00 - 4.80 K/uL    Monos (Absolute) 0.59 0.00 - 0.85 K/uL    Eos (Absolute) 0.07 0.00 - 0.51 K/uL    Baso (Absolute) 0.03 0.00 - 0.12 K/uL    Immature Granulocytes (abs) 0.02 0.00 - 0.11 K/uL    NRBC (Absolute) 0.00 K/uL   Complete Metabolic Panel (CMP)   Result Value Ref Range    Sodium 141 135 - 145 mmol/L    Potassium 4.3 3.6 - 5.5 mmol/L    Chloride 106 96 - 112 mmol/L    Co2 22 20 - 33 mmol/L    Anion Gap 13.0 7.0 - 16.0    Glucose 114 (H) 65 - 99 mg/dL    Bun 15 8 - 22 mg/dL    Creatinine 0.83 0.50 - 1.40 mg/dL    Calcium 9.3 8.5 - 10.5 mg/dL    Correct Calcium 9.2 8.5 - 10.5 mg/dL    AST(SGOT) 39 12 - 45 U/L    ALT(SGPT) 46 2 - 50 U/L    Alkaline Phosphatase 106 (H) 30 - 99 U/L    Total Bilirubin 0.2 0.1 - 1.5 mg/dL    Albumin 4.1 3.2 - 4.9 g/dL    Total Protein 7.1 6.0 - 8.2 g/dL    Globulin 3.0 1.9 - 3.5 g/dL    A-G Ratio 1.4 g/dL   Troponin - STAT Once   Result Value Ref Range    Troponin T 241 (H) 6 - 19 ng/L   PT/INR   Result Value Ref Range    PT 15.5 (H) 12.0 - 14.6 sec    INR 1.21 (H) 0.87 - 1.13   EKG   Result Value Ref Range    Report       [facility] Emergency Dept.    Test Date:  2024  Pt Name:    CHRISTIANO OCHOAFLORES             Department: ER  MRN:        [id number]                      Room:       [facility]  Gender:     Female                       Technician: [id number]  :        1960                   Requested By:ER TRIAGE PROTOCOL  Order #:    [id number]                    Reading MD: Umer WYNN MD    Measurements  Intervals                                Axis  Rate:       73                           P:          76  UT:         169                          QRS:        83  QRSD:       89                            T:          79  QT:         407  QTc:        449    Interpretive Statements  Sinus rhythm  Rate of 73  Intervals normal [place], normal QRS normal normal QTc  Axis normal  Ischemia no significant ST segment elevations or depressions noted.  This is  compared to EKG 8 9/2/2021    Electronically Signed On 04- 12:34:09 PDT by Umer WYNN MD           RADIOLOGY          ED COURSE:    ED Observation Status? No   No noted need for observation for developing issue    INTERVENTIONS BY ME:  Medications   aspirin (Asa) tablet 325 mg (has no administration in time range)         CONSULTANTS/OTHER GROUPS CONTACTED    Dr. Martinez from cardiology is aware    Hospitalist is aware.    FINAL DISPO PLAN   Should be admitted in critical condition    I have looked over the chart several times there is no note that the patient received aspirin and she did states that she did not therefore aspirin 325 mg to be given.    FINAL IMPRESSION  1. Elevated troponin    2. Dyspnea, unspecified type

## 2024-04-03 NOTE — ED NOTES
Pt transported to Tele 7 by RNs on monitor and 4 L NC.  Report to RNs and they know the AntiXA does still need to be drawn.  PT is Ox3, agreeable to plan of care.  1 bag of belongings and purse sent with pt.

## 2024-04-04 ENCOUNTER — APPOINTMENT (OUTPATIENT)
Dept: CARDIOLOGY | Facility: MEDICAL CENTER | Age: 64
DRG: 192 | End: 2024-04-04
Attending: INTERNAL MEDICINE
Payer: MEDICARE

## 2024-04-04 PROBLEM — G47.00 INSOMNIA: Status: ACTIVE | Noted: 2024-04-04

## 2024-04-04 LAB
ACT BLD: 169 SEC (ref 74–137)
ALBUMIN SERPL BCP-MCNC: 4.3 G/DL (ref 3.2–4.9)
ALBUMIN/GLOB SERPL: 1.5 G/DL
ALP SERPL-CCNC: 100 U/L (ref 30–99)
ALT SERPL-CCNC: 38 U/L (ref 2–50)
ANION GAP SERPL CALC-SCNC: 15 MMOL/L (ref 7–16)
APTT PPP: 103.5 SEC (ref 24.7–36)
APTT PPP: 72.1 SEC (ref 24.7–36)
AST SERPL-CCNC: 44 U/L (ref 12–45)
BASOPHILS # BLD AUTO: 0.2 % (ref 0–1.8)
BASOPHILS # BLD: 0.02 K/UL (ref 0–0.12)
BILIRUB SERPL-MCNC: 0.3 MG/DL (ref 0.1–1.5)
BUN SERPL-MCNC: 15 MG/DL (ref 8–22)
CALCIUM ALBUM COR SERPL-MCNC: 8.8 MG/DL (ref 8.5–10.5)
CALCIUM SERPL-MCNC: 9 MG/DL (ref 8.5–10.5)
CHLORIDE SERPL-SCNC: 105 MMOL/L (ref 96–112)
CHOLEST SERPL-MCNC: 156 MG/DL (ref 100–199)
CO2 SERPL-SCNC: 19 MMOL/L (ref 20–33)
CREAT SERPL-MCNC: 0.78 MG/DL (ref 0.5–1.4)
EOSINOPHIL # BLD AUTO: 0.03 K/UL (ref 0–0.51)
EOSINOPHIL NFR BLD: 0.3 % (ref 0–6.9)
ERYTHROCYTE [DISTWIDTH] IN BLOOD BY AUTOMATED COUNT: 44.2 FL (ref 35.9–50)
EST. AVERAGE GLUCOSE BLD GHB EST-MCNC: 137 MG/DL
GFR SERPLBLD CREATININE-BSD FMLA CKD-EPI: 85 ML/MIN/1.73 M 2
GLOBULIN SER CALC-MCNC: 2.9 G/DL (ref 1.9–3.5)
GLUCOSE SERPL-MCNC: 172 MG/DL (ref 65–99)
HBA1C MFR BLD: 6.4 % (ref 4–5.6)
HCT VFR BLD AUTO: 41 % (ref 37–47)
HDLC SERPL-MCNC: 68 MG/DL
HGB BLD-MCNC: 13.8 G/DL (ref 12–16)
IMM GRANULOCYTES # BLD AUTO: 0.03 K/UL (ref 0–0.11)
IMM GRANULOCYTES NFR BLD AUTO: 0.3 % (ref 0–0.9)
LDLC SERPL CALC-MCNC: 80 MG/DL
LV EJECT FRACT  99904: 55
LV EJECT FRACT MOD 2C 99903: 50.87
LV EJECT FRACT MOD 4C 99902: 60.42
LV EJECT FRACT MOD BP 99901: 57.43
LYMPHOCYTES # BLD AUTO: 1.54 K/UL (ref 1–4.8)
LYMPHOCYTES NFR BLD: 16.5 % (ref 22–41)
MCH RBC QN AUTO: 29.9 PG (ref 27–33)
MCHC RBC AUTO-ENTMCNC: 33.7 G/DL (ref 32.2–35.5)
MCV RBC AUTO: 88.9 FL (ref 81.4–97.8)
MONOCYTES # BLD AUTO: 0.16 K/UL (ref 0–0.85)
MONOCYTES NFR BLD AUTO: 1.7 % (ref 0–13.4)
NEUTROPHILS # BLD AUTO: 7.58 K/UL (ref 1.82–7.42)
NEUTROPHILS NFR BLD: 81 % (ref 44–72)
NRBC # BLD AUTO: 0 K/UL
NRBC BLD-RTO: 0 /100 WBC (ref 0–0.2)
PLATELET # BLD AUTO: 193 K/UL (ref 164–446)
PMV BLD AUTO: 10 FL (ref 9–12.9)
POTASSIUM SERPL-SCNC: 4.4 MMOL/L (ref 3.6–5.5)
PROT SERPL-MCNC: 7.2 G/DL (ref 6–8.2)
RBC # BLD AUTO: 4.61 M/UL (ref 4.2–5.4)
SODIUM SERPL-SCNC: 139 MMOL/L (ref 135–145)
TRIGL SERPL-MCNC: 42 MG/DL (ref 0–149)
UFH PPP CHRO-ACNC: 0.86 IU/ML
WBC # BLD AUTO: 9.4 K/UL (ref 4.8–10.8)

## 2024-04-04 PROCEDURE — A9270 NON-COVERED ITEM OR SERVICE: HCPCS

## 2024-04-04 PROCEDURE — 770020 HCHG ROOM/CARE - TELE (206)

## 2024-04-04 PROCEDURE — 700102 HCHG RX REV CODE 250 W/ 637 OVERRIDE(OP): Performed by: BEHAVIOR ANALYST

## 2024-04-04 PROCEDURE — 700101 HCHG RX REV CODE 250

## 2024-04-04 PROCEDURE — A9270 NON-COVERED ITEM OR SERVICE: HCPCS | Performed by: BEHAVIOR ANALYST

## 2024-04-04 PROCEDURE — 700102 HCHG RX REV CODE 250 W/ 637 OVERRIDE(OP)

## 2024-04-04 PROCEDURE — 700117 HCHG RX CONTRAST REV CODE 255: Performed by: INTERNAL MEDICINE

## 2024-04-04 PROCEDURE — 700111 HCHG RX REV CODE 636 W/ 250 OVERRIDE (IP): Performed by: INTERNAL MEDICINE

## 2024-04-04 PROCEDURE — 94664 DEMO&/EVAL PT USE INHALER: CPT

## 2024-04-04 PROCEDURE — 85520 HEPARIN ASSAY: CPT

## 2024-04-04 PROCEDURE — 700111 HCHG RX REV CODE 636 W/ 250 OVERRIDE (IP): Performed by: BEHAVIOR ANALYST

## 2024-04-04 PROCEDURE — 85025 COMPLETE CBC W/AUTO DIFF WBC: CPT

## 2024-04-04 PROCEDURE — 700111 HCHG RX REV CODE 636 W/ 250 OVERRIDE (IP)

## 2024-04-04 PROCEDURE — 85730 THROMBOPLASTIN TIME PARTIAL: CPT

## 2024-04-04 PROCEDURE — 80061 LIPID PANEL: CPT

## 2024-04-04 PROCEDURE — C1769 GUIDE WIRE: HCPCS

## 2024-04-04 PROCEDURE — 99232 SBSQ HOSP IP/OBS MODERATE 35: CPT | Mod: 25 | Performed by: INTERNAL MEDICINE

## 2024-04-04 PROCEDURE — A9270 NON-COVERED ITEM OR SERVICE: HCPCS | Performed by: INTERNAL MEDICINE

## 2024-04-04 PROCEDURE — B2111ZZ FLUOROSCOPY OF MULTIPLE CORONARY ARTERIES USING LOW OSMOLAR CONTRAST: ICD-10-PCS | Performed by: INTERNAL MEDICINE

## 2024-04-04 PROCEDURE — 99407 BEHAV CHNG SMOKING > 10 MIN: CPT

## 2024-04-04 PROCEDURE — 700102 HCHG RX REV CODE 250 W/ 637 OVERRIDE(OP): Performed by: INTERNAL MEDICINE

## 2024-04-04 PROCEDURE — 83036 HEMOGLOBIN GLYCOSYLATED A1C: CPT

## 2024-04-04 PROCEDURE — 93306 TTE W/DOPPLER COMPLETE: CPT | Mod: 26 | Performed by: INTERNAL MEDICINE

## 2024-04-04 PROCEDURE — 85347 COAGULATION TIME ACTIVATED: CPT

## 2024-04-04 PROCEDURE — 93458 L HRT ARTERY/VENTRICLE ANGIO: CPT | Mod: 26 | Performed by: INTERNAL MEDICINE

## 2024-04-04 PROCEDURE — 4A023N7 MEASUREMENT OF CARDIAC SAMPLING AND PRESSURE, LEFT HEART, PERCUTANEOUS APPROACH: ICD-10-PCS | Performed by: INTERNAL MEDICINE

## 2024-04-04 PROCEDURE — 80053 COMPREHEN METABOLIC PANEL: CPT

## 2024-04-04 RX ORDER — ASPIRIN 81 MG/1
81 TABLET ORAL DAILY
Status: DISCONTINUED | OUTPATIENT
Start: 2024-04-04 | End: 2024-04-05 | Stop reason: HOSPADM

## 2024-04-04 RX ORDER — HEPARIN SODIUM 1000 [USP'U]/ML
INJECTION, SOLUTION INTRAVENOUS; SUBCUTANEOUS
Status: COMPLETED
Start: 2024-04-04 | End: 2024-04-04

## 2024-04-04 RX ORDER — DIPHENHYDRAMINE HCL 25 MG
25 TABLET ORAL EVERY 6 HOURS PRN
Status: DISCONTINUED | OUTPATIENT
Start: 2024-04-04 | End: 2024-04-05 | Stop reason: HOSPADM

## 2024-04-04 RX ORDER — VERAPAMIL HYDROCHLORIDE 2.5 MG/ML
INJECTION, SOLUTION INTRAVENOUS
Status: COMPLETED
Start: 2024-04-04 | End: 2024-04-04

## 2024-04-04 RX ORDER — LORAZEPAM 0.5 MG/1
0.5 TABLET ORAL EVERY 4 HOURS PRN
Status: DISCONTINUED | OUTPATIENT
Start: 2024-04-04 | End: 2024-04-05 | Stop reason: HOSPADM

## 2024-04-04 RX ORDER — HEPARIN SODIUM 200 [USP'U]/100ML
INJECTION, SOLUTION INTRAVENOUS
Status: COMPLETED
Start: 2024-04-04 | End: 2024-04-04

## 2024-04-04 RX ORDER — MIDAZOLAM HYDROCHLORIDE 1 MG/ML
INJECTION INTRAMUSCULAR; INTRAVENOUS
Status: COMPLETED
Start: 2024-04-04 | End: 2024-04-04

## 2024-04-04 RX ORDER — LIDOCAINE HYDROCHLORIDE 20 MG/ML
INJECTION, SOLUTION INFILTRATION; PERINEURAL
Status: COMPLETED
Start: 2024-04-04 | End: 2024-04-04

## 2024-04-04 RX ORDER — HYDROXYZINE HYDROCHLORIDE 25 MG/1
25 TABLET, FILM COATED ORAL 2 TIMES DAILY PRN
Status: DISCONTINUED | OUTPATIENT
Start: 2024-04-04 | End: 2024-04-05 | Stop reason: HOSPADM

## 2024-04-04 RX ORDER — ASPIRIN 81 MG/1
TABLET, CHEWABLE ORAL
Status: COMPLETED
Start: 2024-04-04 | End: 2024-04-04

## 2024-04-04 RX ADMIN — ASPIRIN 81 MG: 81 TABLET, COATED ORAL at 17:04

## 2024-04-04 RX ADMIN — PREGABALIN 200 MG: 100 CAPSULE ORAL at 17:04

## 2024-04-04 RX ADMIN — VERAPAMIL HYDROCHLORIDE 2.5 MG: 2.5 INJECTION INTRAVENOUS at 13:57

## 2024-04-04 RX ADMIN — IOHEXOL 18 ML: 350 INJECTION, SOLUTION INTRAVENOUS at 13:58

## 2024-04-04 RX ADMIN — PREGABALIN 200 MG: 100 CAPSULE ORAL at 05:15

## 2024-04-04 RX ADMIN — MIDAZOLAM HYDROCHLORIDE 1.5 MG: 2 INJECTION, SOLUTION INTRAMUSCULAR; INTRAVENOUS at 14:01

## 2024-04-04 RX ADMIN — PREGABALIN 200 MG: 100 CAPSULE ORAL at 11:34

## 2024-04-04 RX ADMIN — PREDNISONE 40 MG: 20 TABLET ORAL at 05:15

## 2024-04-04 RX ADMIN — ASPIRIN 81 MG: 81 TABLET, CHEWABLE ORAL at 13:34

## 2024-04-04 RX ADMIN — HEPARIN SODIUM: 1000 INJECTION, SOLUTION INTRAVENOUS; SUBCUTANEOUS at 13:56

## 2024-04-04 RX ADMIN — NITROGLYCERIN: 20 INJECTION INTRAVENOUS at 13:30

## 2024-04-04 RX ADMIN — DULOXETINE HYDROCHLORIDE 60 MG: 60 CAPSULE, DELAYED RELEASE ORAL at 05:16

## 2024-04-04 RX ADMIN — MIRTAZAPINE 15 MG: 15 TABLET, FILM COATED ORAL at 21:04

## 2024-04-04 RX ADMIN — ATORVASTATIN CALCIUM 40 MG: 40 TABLET, FILM COATED ORAL at 05:16

## 2024-04-04 RX ADMIN — METOPROLOL TARTRATE 25 MG: 25 TABLET, FILM COATED ORAL at 05:15

## 2024-04-04 RX ADMIN — HEPARIN SODIUM 950 UNITS/HR: 5000 INJECTION, SOLUTION INTRAVENOUS at 09:01

## 2024-04-04 RX ADMIN — UMECLIDINIUM BROMIDE AND VILANTEROL TRIFENATATE 1 PUFF: 62.5; 25 POWDER RESPIRATORY (INHALATION) at 08:11

## 2024-04-04 RX ADMIN — LIDOCAINE HYDROCHLORIDE: 20 INJECTION, SOLUTION INFILTRATION; PERINEURAL at 13:57

## 2024-04-04 RX ADMIN — DOCUSATE SODIUM 50 MG AND SENNOSIDES 8.6 MG 2 TABLET: 8.6; 5 TABLET, FILM COATED ORAL at 17:05

## 2024-04-04 RX ADMIN — METOPROLOL TARTRATE 25 MG: 25 TABLET, FILM COATED ORAL at 17:04

## 2024-04-04 RX ADMIN — FENTANYL CITRATE 75 MCG: 50 INJECTION, SOLUTION INTRAMUSCULAR; INTRAVENOUS at 14:00

## 2024-04-04 RX ADMIN — HEPARIN SODIUM 2000 UNITS: 200 INJECTION, SOLUTION INTRAVENOUS at 13:40

## 2024-04-04 RX ADMIN — RIVAROXABAN 20 MG: 20 TABLET, FILM COATED ORAL at 17:05

## 2024-04-04 ASSESSMENT — COPD QUESTIONNAIRES
DURING THE PAST 4 WEEKS HOW MUCH DID YOU FEEL SHORT OF BREATH: SOME OF THE TIME
DO YOU EVER COUGH UP ANY MUCUS OR PHLEGM?: NO/ONLY WITH OCCASIONAL COLDS OR INFECTIONS
COPD SCREENING SCORE: 4
HAVE YOU SMOKED AT LEAST 100 CIGARETTES IN YOUR ENTIRE LIFE: NO/DON'T KNOW

## 2024-04-04 ASSESSMENT — PAIN DESCRIPTION - PAIN TYPE: TYPE: ACUTE PAIN

## 2024-04-04 ASSESSMENT — FIBROSIS 4 INDEX: FIB4 SCORE: 2.37

## 2024-04-04 NOTE — PROGRESS NOTES
[facility] MEDICINE PROGRESS NOTE     Attending: Taya Reyes MD    Resident: Umer Shaikh MD    PATIENT: Iesha Swanson; [id number]; 1960    ID:   63 y.o. female w/ pmh of ukn clotting disease c/b many DVT on chx xarelto, PAD (lt SFA occlusion), htn, hld, copd (not on O2) transferred from [facility] d/t elevated troponin (~500s).     SUBJECTIVE: No acute events overnight, patient reports symptoms under last night treated with mirtazapine, but did not sleep well.  This morning she is denying current headache, chest pain, shortness of breath, nausea vomiting diarrhea.  She feels improved today prior.  She does endorse some anxiousness surrounding her cath procedure today.    OBJECTIVE:  Temp:  [36.5 °C (97.7 °F)-36.7 °C (98.1 °F)] 36.7 °C (98.1 °F)  Pulse:  [57-77] 63  Resp:  [16-18] 17  BP: ()/(57-83) 126/63  SpO2:  [89 %-96 %] 89 %      Intake/Output Summary (Last 24 hours) at 4/4/2024 0645  Last data filed at 4/4/2024 0400  Gross per 24 hour   Intake 240 ml   Output 0 ml   Net 240 ml       PE:   General: Anxious, ambulating about room.  HEENT: NC/AT. EOMI. MMM  Cardiovascular: RRR, no m/r/g, normal S1/S2  Respiratory: Symmetric inspiratory effort. CTAB with wheezing in the right lung fields greater than left lung fields  Abdomen: BS+, soft, NT/ND   EXT:  PORTER, 2+ radial pulses, no lower extremity edema bilaterally  Neuro: Non focal, alert and oriented    LABS:  Recent Labs     04/03/24  1212 04/04/24  0137   WBC 9.5 9.4   RBC 4.44 4.61   HEMOGLOBIN 13.4 13.8   HEMATOCRIT 39.4 41.0   MCV 88.7 88.9   MCH 30.2 29.9   RDW 43.8 44.2   PLATELETCT 232 193   MPV 9.6 10.0   NEUTSPOLYS 66.40 81.00*   LYMPHOCYTES 26.20 16.50*   MONOCYTES 6.20 1.70   EOSINOPHILS 0.70 0.30   BASOPHILS 0.30 0.20     Recent Labs     04/03/24  1212 04/04/24  0103   SODIUM 141 139   POTASSIUM 4.3 4.4   CHLORIDE 106 105   CO2 22 19*   BUN 15 15   CREATININE 0.83 0.78   CALCIUM 9.3 9.0   ALBUMIN 4.1 4.3     Estimated  GFR/CRCL = Estimated Creatinine Clearance: 75.2 mL/min (by C-G formula based on SCr of 0.78 mg/dL).  Recent Labs     04/03/24  1212 04/04/24  0103   GLUCOSE 114* 172*     Recent Labs     04/03/24  1212 04/04/24  0103   ASTSGOT 39 44   ALTSGPT 46 38   TBILIRUBIN 0.2 0.3   ALKPHOSPHAT 106* 100*   GLOBULIN 3.0 2.9   INR 1.21*  --              Recent Labs     04/03/24  1212 04/04/24  0103 04/04/24  0734   INR 1.21*  --   --    APTT  --  72.1* 103.5*       IMAGING:  EC-ECHOCARDIOGRAM COMPLETE W/O CONT   Final Result      DX-CHEST-LIMITED (1 VIEW)   Final Result         No acute cardiopulmonary abnormalities are identified.      CL-LEFT HEART CATHETERIZATION WITH POSSIBLE INTERVENTION    (Results Pending)       MEDS:  Scheduled Medications   Medication Dose Frequency    aspirin  81 mg DAILY    senna-docusate  2 Tablet Q EVENING    predniSONE  40 mg DAILY    atorvastatin  40 mg DAILY    DULoxetine  60 mg DAILY    metoprolol tartrate  25 mg BID    pregabalin  200 mg TID    rivaroxaban  20 mg PM MEAL    umeclidinium-vilanterol  1 Puff QDAILY (RT)    mirtazapine  15 mg QHS     PRN medications: hydrOXYzine HCl, diphenhydrAMINE, LORazepam, acetaminophen, senna-docusate **AND** polyethylene glycol/lytes, labetalol, ondansetron, ondansetron, promethazine, promethazine, prochlorperazine, ipratropium-albuterol, Respiratory Therapy Consult, albuterol    ASSESSMENT/PLAN:   Iesha Swanson is a 63 y.o. female with chest pain and elevated troponins who went to the Cath Lab on 4/4 which showed normal-appearing epicardial coronary arteries with mild slow coronary flow phenomena.    * Chest pain- (present on admission)  Assessment & Plan  Patient presented from outside facility with troponins greater than 500.  Upon presentation to [facility] they are in the 200s, ECG with somewhat scooped ST segments as well as peaked T waves but otherwise unremarkable with respect to ischemic changes.      Cardiology was consulted and took the patient to  the Cath Lab on 4/4 with no vessel disease appreciated.  They ruled this an NSTEMI.  Can consider coronary vasospasm as a differential for her atypical angina and elevated troponins.  Echo reassuring    -Heparin drip discontinued  -Can resume Xarelto 20 mg  -CBC CMP in a.m.  -Ordered enteric-coated aspirin 81 mg p.o. daily   -Continue atorvastatin 40 mg p.o. nightly     Insomnia  Assessment & Plan  Patient reports symptoms of mild insomnia that she treats at home with as needed Tylenol PM.  She is interested in active sleeping medication Benadryl.  -As needed Benadryl    Mental health disorder  Assessment & Plan  #panic disorder  #anxiety  #depression    Patient under lots of stress as her  has an opiate addiction, and she is constantly worried about him and she is his main caretaker. History of suicide attempt many years ago, at this time no SI/HI    -cont home duloxetine  -Home mirtazapine  -As needed of Ativan  -As needed hydroxyzine    HLD (hyperlipidemia)  Assessment & Plan  On atorvastatin, to be continued inpatient.    HTN (hypertension)  Assessment & Plan  Patient metoprolol at home  Continue home med.  PRN ordered    COPD exacerbation (HCC)  Assessment & Plan  #hypoxia (resolved)    Prior to chest discomfort patient was experiencing 3 days of history of URI with increased cough and sputum production, patient has underlying COPD, patient states that she had to use her albuterol multiple times as well as nebulized albuterol which seemed to only help her breathing slightly.  Patient has expiratory wheezes on admission.  Patient not on maintenance inhaler at home.    -Prednisone course  -DuoNebs as needed  -Continuous pulse ox  -RT consult  -Continue home albuterol as needed  -O2 supplementation as needed  -Start LABA/LAMA inhaler      #Disposition: Admitted for workup of elevated troponins and chest pain    Core Measures:  Fluids: P.o.  Lines: PIV right forearm and right hand p.o.  Abx: None  Diet:  Cardiac  PPX: Xarelto, S/p heparin drip none    CODE STATUS: Full    Umer Shaikh MD  PGY1  [facility]

## 2024-04-04 NOTE — DISCHARGE PLANNING
Met with Pt at bedside, A&Ox4. Daughter (Alayna Swanson [phone]) and FARA also at bedside.  Pt lives in mobile home with stairs to enter in [place] with Spouse. Has good support from daughter and spouse  Pt is independent with ADL/IADLs, no DME in use/needed  PCP is Adelita Napoles in [place]  Primary Pharmacy [facility]  Family can provide transportation home upon discharge   Patient is retired, reports hx or marijuana use current, and depression and anxiety    Care Transition Team Assessment    Information Source  Orientation Level: Oriented X4  Information Given By: Patient  Informant's Name: Iesha  Who is responsible for making decisions for patient? : Patient         Elopement Risk  Legal Hold: No  Ambulatory or Self Mobile in Wheelchair: Yes  Disoriented: No  Psychiatric Symptoms: None  History of Wandering: No  Elopement this Admit: No  Vocalizing Wanting to Leave: No  Displays Behaviors, Body Language Wanting to Leave: No-Not at Risk for Elopement  Elopement Risk: Not at Risk for Elopement    Interdisciplinary Discharge Planning  Does Admitting Nurse Feel This Could be a Complex Discharge?: No  Primary Care Physician: Adelita Napoles  Lives with - Patient's Self Care Capacity: Spouse  Patient or legal guardian wants to designate a caregiver: No  Support Systems: Children, Spouse / Significant Other  Housing / Facility: Mobile Home  Do You Take your Prescribed Medications Regularly: Yes  Able to Return to Previous ADL's: Yes  Mobility Issues: No  Prior Services: None  Patient Prefers to be Discharged to:: home  Assistance Needed: No  Durable Medical Equipment: Not Applicable    Discharge Preparedness  What is your plan after discharge?: Home with help  What are your discharge supports?: Child, Spouse  Prior Functional Level: Ambulatory, Independent with Activities of Daily Living, Independent with Medication Management  Difficulity with ADLs: None  Difficulity with IADLs:  None    Functional Assesment  Prior Functional Level: Ambulatory, Independent with Activities of Daily Living, Independent with Medication Management    Finances  Financial Barriers to Discharge: No  Prescription Coverage: Yes    Vision / Hearing Impairment  Vision Impairment : Yes  Right Eye Vision: Impaired  Left Eye Vision: Impaired  Hearing Impairment : No              Domestic Abuse  Have you ever been the victim of abuse or violence?: No  Physical Abuse or Sexual Abuse: No  Verbal Abuse or Emotional Abuse: No  Possible Abuse/Neglect Reported to:: Not Applicable    Psychological Assessment  History of Substance Abuse: Marijuana (reports current use)  History of Psychiatric Problems: Yes (reports depression and anxiety)         Anticipated Discharge Information  Discharge Disposition: Discharged to home/self care (01)

## 2024-04-04 NOTE — PROGRESS NOTES
Patient returned from cath lab. Patient is alert and oriented x4 and on RA. Monitor placed back on patient, monitor room notified. Confirmed placement with tech. TR band with 14 cc present on right wrist. Cath site soft and clean. Bilateral radial pulses present and equal. Post sedation vitals initiated.

## 2024-04-04 NOTE — PROGRESS NOTES
Pt care assumed @ 0645. Bedside report received from nightshift Rn. Pt resting comfortably in bed at this time. No c/o pain. Bed in lowest position, side rails up x2. Heparin checked and infusing at 1050 units/hr. Call bell and personal belongings within reach.

## 2024-04-04 NOTE — H&P
"[facility] MEDICINE HISTORY AND PHYSICAL     PATIENT ID:  NAME:  Iesha Swanson  MRN:               [id number]  YOB: 1960    Date of Admission: 4/3/2024   Attending: Ana M Reagan M.d.  Resident: Marcus Singer MD   Primary Care Physician:  Adelita Napoles M.D.    CC:  elevated trops at outside facility      HPI: 64yo F w/ pmh of ukn clotting disease c/b many DVT on chx xarelto, PAD (lt SFA occlusion), htn, hld, copd (not on O2) transferred from [facility] d/t elevated troponin (~500s). Pt reports 1d hx of nausea (w/o vomit) a/w rt sided chest tingling radiating to rt arm; these sx were preceded by ~6wks of worsening ARSHAD; pt reports no a/w cp, jaw pain, palpitations, pre/syncope, fnd, HA, visual disturbances, fevers/chills.     NM cardiac stress test neg in 2021 w/ normal EF.    Pt also reports having had a \"cold\" for the past 3d a/w rhinorrhea, cough w/ increased sputum production, as well as wheezing not fully resolved after multiple albuterol inhaler uses and x1 albuterol nebulizer.     Pt has a 15 P-Y smoking hx, no alcohol use, no drug use other than recreational cannabis.    Patient has no pertinent past surgical history    ERCourse:  CBC unremarkable, CMP unremarkable, troponins elevated at 241; chest x-ray negative; cardiology consulted, recommending cath in the morning; aspirin 325 given in ED.    REVIEW OF SYSTEMS:   Ten systems reviewed and were negative except as noted in the HPI.                PAST MEDICAL HISTORY:  Past Medical History:   Diagnosis Date    Depression     DVT (deep venous thrombosis) (HCC)        PAST SURGICAL HISTORY:  History reviewed. No pertinent surgical history.    FAMILY HISTORY:  History reviewed. No pertinent family history.    SOCIAL HISTORY:   See HPI    DIET:   Orders Placed This Encounter   Procedures    Diet Order Diet: Cardiac     Standing Status:   Standing     Number of Occurrences:   1     Order Specific Question:   Diet:     " Answer:   Cardiac [6]    Diet NPO Restrict to: Sips with Medications     Standing Status:   Standing     Number of Occurrences:   8     Order Specific Question:   Diet NPO Restrict to:     Answer:   Sips with Medications [3]       ALLERGIES:  No Known Allergies    OUTPATIENT MEDICATIONS:    Current Facility-Administered Medications:     heparin injection 2,600 Units, 2,600 Units, Intravenous, PRN **AND** heparin infusion 25,000 Units in 500 mL 0.45% NACL, , Intravenous, Continuous **AND** Protocol 440 Heparin Weight Based DO NOT GIVE ANY HEPARIN BOLUS TO STROKE PATIENT, , , CONTINUOUS **AND** Protocol 440 Heparin Weight Based Discontinue Enoxaparin (Lovenox), Dabigatran (Pradaxa), Rivaroxaban (Xarelto), Apixaban (Eliquis), Edoxaban (Savaysa, Lixiana), Fondaparinux (Arixtra) and Argatroban prior to heparin administration, , , Once **AND** Protocol 440 Heparin Weight Based Draw baseline aPTT, PT, and platelet count if not already done, , , CONTINUOUS **AND** Protocol 440 Heparin Weight Based Draw aPTT 6 hours after beginning infusion. , , , CONTINUOUS **AND** Protocol 440 Heparin Weight Based Record Patient Data, , , CONTINUOUS **AND** Protocol 440 Heparin Weight Based INSTRUCTIONS, , , CONTINUOUS **AND** Protocol 440 Heparin Weight Based Review aPTT results 6 hours after infusion is begun as detailed, , , CONTINUOUS **AND** Protocol 440 Heparin Weight Based Draw Platelet count every three days. Contact MD if platelet is 50% lower than baseline count., , , CONTINUOUS **AND** Protocol 440 Heparin Weight Based Adjust heparin to maintain aPTT between 55-96 sec, , , CONTINUOUS **AND** Protocol 440 Heparin Weight Based Order aPTT 6 hours after any rate change or hold until aPTT is therapeutic (55-96 seconds), , , CONTINUOUS **AND** Protocol 440 Heparin Weight Based Documentation and verification, , , CONTINUOUS, Vivien Martinez M.D.    acetaminophen (Tylenol) tablet 650 mg, 650 mg, Oral, Q6HRS PRN, Marcus Singer M.D.     senna-docusate (Pericolace Or Senokot S) 8.6-50 MG per tablet 2 Tablet, 2 Tablet, Oral, Q EVENING, 2 Tablet at 04/03/24 1811 **AND** polyethylene glycol/lytes (Miralax) Packet 1 Packet, 1 Packet, Oral, QDAY PRN, Marcus Singer M.D.    labetalol (Normodyne/Trandate) injection 10 mg, 10 mg, Intravenous, Q4HRS PRN, Marcus Singer M.D.    ondansetron (Zofran) syringe/vial injection 4 mg, 4 mg, Intravenous, Q4HRS PRN, Marcus Singer M.D.    ondansetron (Zofran ODT) dispertab 4 mg, 4 mg, Oral, Q4HRS PRN, Marcus Singer M.D.    promethazine (Phenergan) tablet 12.5-25 mg, 12.5-25 mg, Oral, Q4HRS PRN, Marcus Singer M.D.    promethazine (Phenergan) suppository 12.5-25 mg, 12.5-25 mg, Rectal, Q4HRS PRN, Marcus Singer M.D.    prochlorperazine (Compazine) injection 5-10 mg, 5-10 mg, Intravenous, Q4HRS PRN, Marcus Singer M.D.    ipratropium-albuterol (DUONEB) nebulizer solution, 3 mL, Nebulization, Q4H PRN (RT), Marcus Singer M.D.    Respiratory Therapy Consult, , Nebulization, Continuous RT, Marcus Singer M.D.    predniSONE (Deltasone) tablet 40 mg, 40 mg, Oral, DAILY, Marcus Singer M.D., 40 mg at 04/03/24 1811    [START ON 4/4/2024] atorvastatin (Lipitor) tablet 40 mg, 40 mg, Oral, DAILY, Marcus Singer M.D.    [START ON 4/4/2024] DULoxetine (Cymbalta) capsule 60 mg, 60 mg, Oral, DAILY, Marcus Singer M.D.    metoprolol tartrate (Lopressor) tablet 25 mg, 25 mg, Oral, BID, Marcus Singer M.D., 25 mg at 04/03/24 1810    pregabalin (Lyrica) capsule 200 mg, 200 mg, Oral, TID, Marcus Singer M.D., 200 mg at 04/03/24 1810    albuterol inhaler 2-3 Puff, 2-3 Puff, Inhalation, Q6HRS PRN, Marcus Singer M.D.    [Held by provider] rivaroxaban (Xarelto) tablet 20 mg, 20 mg, Oral, PM MEAL, Marcus Singer M.D.    umeclidinium-vilanterol (Anoro Ellipta) inhaler 1 Puff, 1 Puff, Inhalation, QDAILY (RT), Marcus Singer M.D.    PHYSICAL EXAM:  Vitals:    04/03/24 1704 04/03/24 1711 04/03/24 1724 04/03/24  1726   BP:    (!) 88/57   Pulse:    77   Resp:    16   Temp:    36.6 °C (97.9 °F)   TempSrc:    Temporal   SpO2:  93%  94%   Weight: 82.4 kg (181 lb 10.5 oz)  82.4 kg (181 lb 10.5 oz) 82.4 kg (181 lb 10.5 oz)   Height:       , Temp (24hrs), Av.6 °C (97.9 °F), Min:36.6 °C (97.8 °F), Max:36.6 °C (97.9 °F)  , Pulse Oximetry: 94 %, O2 (LPM): 0, FiO2%: (!) 4 %, O2 Delivery Device: Nasal Cannula    General: Pt resting in NAD, cooperative   Skin:  Pink, warm and dry.  No rashes  HEENT: NC/AT. PERRL. EOMI. MMM. No nasal discharge. Oropharynx nonerythematous without exudate/plaques  Neck:  Supple without lymphadenopathy or rigidity.   Lungs:  Symmetrical.  Patient has expiratory wheezes bilaterally  Cardiovascular:  S1/S2 RRR without murmurs, no hepatojugular reflex,cap refill intact, pulses in lower extremities 2+ DP/PT on the left, poorly palpable on the right  Abdomen:  Abdomen is soft, nontender, nondistended. +BS. No masses noted.  Genitourinary: Deferred  Extremities:  Full range of motion. No gross deformities noted. 2+ pulses in all extremities. No edema   Spine:  Straight without vertebral anomalies.  CNS:  Muscle tone is normal. No gross focal neurologic deficits      LAB TESTS:   Recent Labs     24  1212   WBC 9.5   RBC 4.44   HEMOGLOBIN 13.4   HEMATOCRIT 39.4   MCV 88.7   MCH 30.2   RDW 43.8   PLATELETCT 232   MPV 9.6   NEUTSPOLYS 66.40   LYMPHOCYTES 26.20   MONOCYTES 6.20   EOSINOPHILS 0.70   BASOPHILS 0.30         Recent Labs     24  1212   SODIUM 141   POTASSIUM 4.3   CHLORIDE 106   CO2 22   BUN 15   CREATININE 0.83   CALCIUM 9.3   ALBUMIN 4.1       CULTURES:   Results       ** No results found for the last 168 hours. **            IMAGES:  DX-CHEST-LIMITED (1 VIEW)   Final Result         No acute cardiopulmonary abnormalities are identified.      CL-LEFT HEART CATHETERIZATION WITH POSSIBLE INTERVENTION    (Results Pending)   EC-ECHOCARDIOGRAM COMPLETE W/O CONT    (Results Pending)        CONSULTS:   Cardiology consulted in ED    ASSESSMENT/PLAN:   63 y.o. female admitted for ACS rule out.    At this time patient has multiple risk factors for CAD there is concern for ongoing ACS given elevated troponins.  Once patient arrived on telemetry floor patient required 4L of O2, but she was weaned down to room air and given breathing treatments, her hypoxia resolved. Given this patient's underlying unknown clotting disease C/B history of DVTs, there is a low threshold for PE, given that her oxygen requirement has returned to room air and she is not tachy or dyspneic from baseline in ED I am inclined to hold off on CTPE at this time; patient is on heparin drip per cardiology at this time.  Will await echo and evaluate any right heart strain or if patient status deteriorates and reevaluate at that time.    * Chest pain- (present on admission)  Assessment & Plan  Patient does not have karma chest pain, more of a right-sided chest tingling radiating into the right arm accompanied with nausea, no vomiting.  Patient has noticed ARSHAD worsening in the past few weeks.  Troponins at outside facility ~500, at [facility] they were in the 200s.  ECG shows somewhat scooped ST segments as well as somewhat peaked T waves otherwise unremarkable in regard to ischemic changes.  Cardiology consulted from the ED, recommendations appreciated.    -Admit to hospital [facility]  -Cardiac cath in the morning per cards  -Heparin drip initiated  -Obtain echo  -N.p.o. at midnight  -CBC CMP in a.m.  -Fasting lipids in a.m.          COPD exacerbation (HCC)  Assessment & Plan  #hypoxia (resolved)    Prior to chest discomfort patient was experiencing 3 days of history of URI with increased cough and sputum production, patient has underlying COPD, patient states that she had to use her albuterol multiple times as well as nebulized albuterol which seemed to only help her breathing slightly.  Patient has expiratory wheezes on admission.  Patient  not on maintenance inhaler at home.    -Prednisone course  -DuoNebs as needed  -Continuous pulse ox  -RT consult  -Continue home albuterol as needed  -O2 supplementation as needed  -Start LABA/LAMA inhaler    Mental health disorder  Assessment & Plan  #panic disorder  #anxiety  #depression    Patient under lots of stress as her  has an opiate addiction, and she is constantly worried about him and she is his main caretaker. History of suicide attempt many years ago, at this time no SI/HI    -cont home duloxetine    HLD (hyperlipidemia)  Assessment & Plan  On atorvastatin, to be continued inpatient.    HTN (hypertension)  Assessment & Plan  Patient metoprolol at home  Continue home med.  PRN ordered        Dispo: Admit to [facility] service

## 2024-04-04 NOTE — PROGRESS NOTES
Cardiology Follow-up Consult Note    Date of Service:    4/4/2024      Consulting Physician: Ana M Reagan M.D.    Name:   Iesha Swanson   YOB: 1960  Age:   63 y.o.  female   MRN:   [id number]      Subjective:  Patient reports breathing is better after breathing treatments and was given a dose of prednisone.  Patient still sometimes though will feel tightness, this morning she thought maybe was her anxiety.    All other review of systems reviewed and negative.    Past medical, surgical, social, and family history reviewed and unchanged from admission except as noted in assessment and plan.    Medications Prior to Admission   Medication Sig Dispense Refill Last Dose    B Complex Vitamins (B COMPLEX PO) Take 1 Tablet by mouth every day.   4/2/2024 at 0700    MAGNESIUM PO Take 1 Tablet by mouth every day.   4/2/2024 at pm    POTASSIUM PO Take 1 Tablet by mouth 2 times a day.   4/2/2024 at pm    metoprolol tartrate (LOPRESSOR) 25 MG Tab Take 25 mg by mouth 2 times a day.   4/2/2024 at pm    XARELTO 20 MG Tab tablet Take 1 Tablet by mouth with dinner. 90 Tablet 3 4/2/2024 at am    PROAIR  (90 Base) MCG/ACT Aero Soln inhalation aerosol Inhale 2-3 Puffs every 6 hours as needed for Shortness of Breath.   4/2/2024 at pm    atorvastatin (LIPITOR) 40 MG Tab Take 40 mg by mouth every day.   4/2/2024 at 0700    DULoxetine (CYMBALTA) 60 MG Cap DR Particles delayed-release capsule Take 60 mg by mouth every day.   4/2/2024 at 0700    fluticasone (FLONASE) 50 MCG/ACT nasal spray Administer 2 Sprays into affected nostril(S) every day.   4/2/2024 at 0700    pregabalin (LYRICA) 200 MG capsule Take 200 mg by mouth in the morning, at noon, and at bedtime.   4/2/2024 at afternoon     Current Facility-Administered Medications   Medication Dose Frequency Provider Last Rate Last Admin    heparin injection 2,600 Units  2,600 Units PRN Vivien Martinez M.D.        And    heparin infusion 25,000 Units in 500  mL 0.45% NACL   Continuous Vivien Martinez M.D. 21 mL/hr at 04/04/24 0724 1,050 Units/hr at 04/04/24 0724    acetaminophen (Tylenol) tablet 650 mg  650 mg Q6HRS PRN Marcus Singer M.D.        senna-docusate (Pericolace Or Senokot S) 8.6-50 MG per tablet 2 Tablet  2 Tablet Q EVENING Marcus Singer M.D.   2 Tablet at 04/03/24 1811    And    polyethylene glycol/lytes (Miralax) Packet 1 Packet  1 Packet QDAY PRN Marcus Singer M.D.        labetalol (Normodyne/Trandate) injection 10 mg  10 mg Q4HRS PRN Marcus Singer M.D.        ondansetron (Zofran) syringe/vial injection 4 mg  4 mg Q4HRS PRN Marcus Singer M.D.        ondansetron (Zofran ODT) dispertab 4 mg  4 mg Q4HRS PRN Marcus Singer M.D.        promethazine (Phenergan) tablet 12.5-25 mg  12.5-25 mg Q4HRS PRN Marcus Singer M.D.        promethazine (Phenergan) suppository 12.5-25 mg  12.5-25 mg Q4HRS PRN Marcus Singer M.D.        prochlorperazine (Compazine) injection 5-10 mg  5-10 mg Q4HRS PRN Marcus Singer M.D.        ipratropium-albuterol (DUONEB) nebulizer solution  3 mL Q4H PRN (RT) Marcus Singer M.D.        Respiratory Therapy Consult   Continuous RT Marcus Singer M.D.        predniSONE (Deltasone) tablet 40 mg  40 mg DAILY Marcus Singer M.D.   40 mg at 04/04/24 0515    atorvastatin (Lipitor) tablet 40 mg  40 mg DAILY Marcus Singer M.D.   40 mg at 04/04/24 0516    DULoxetine (Cymbalta) capsule 60 mg  60 mg DAILY Marcus Singer M.D.   60 mg at 04/04/24 0516    metoprolol tartrate (Lopressor) tablet 25 mg  25 mg BID Marcus Singer M.D.   25 mg at 04/04/24 0515    pregabalin (Lyrica) capsule 200 mg  200 mg TID Marcus Singer M.D.   200 mg at 04/04/24 0515    albuterol inhaler 2-3 Puff  2-3 Puff Q6HRS PRN Marcus Singer M.D.        [Held by provider] rivaroxaban (Xarelto) tablet 20 mg  20 mg PM MEAL Marcus Singer M.D.        umeclidinium-vilanterol (Anoro Ellipta) inhaler 1 Puff  1 Puff QDAILY (RT) Marcus Singer M.D.     "    mirtazapine (Remeron) tablet 15 mg  15 mg QHS Roseline Hua M.D.   15 mg at 04/03/24 2324   Last reviewed on 4/3/2024  5:43 PM by Ranjan Gomez R.N.     No Known Allergies      Intake/Output Summary (Last 24 hours) at 4/4/2024 0744  Last data filed at 4/4/2024 0724  Gross per 24 hour   Intake 240 ml   Output 0 ml   Net 240 ml        Physical Exam  Body mass index is 30.84 kg/m².  /75   Pulse 61   Temp 36.5 °C (97.7 °F) (Temporal)   Resp 18   Ht 1.626 m (5' 4\")   Wt 81.5 kg (179 lb 10.8 oz)   SpO2 92%   Vitals:    04/04/24 0000 04/04/24 0400 04/04/24 0515 04/04/24 0722   BP: 110/74 109/74 117/72 116/75   Pulse: 73 72 69 61   Resp: 18 18  18   Temp:    36.5 °C (97.7 °F)   TempSrc: Temporal Temporal  Temporal   SpO2: 90% 92%  92%   Weight:  81.5 kg (179 lb 10.8 oz)     Height:         Oxygen Therapy:  Pulse Oximetry: 92 %, O2 (LPM): 0, O2 Delivery Device: None - Room Air    Physical Exam  Constitutional:       Appearance: Normal appearance.   Neck:      Vascular: No JVD.   Cardiovascular:      Rate and Rhythm: Normal rate and regular rhythm.      Pulses: Normal pulses and intact distal pulses.      Heart sounds: S1 normal and S2 normal. No murmur heard.     No friction rub. No gallop.   Pulmonary:      Effort: Pulmonary effort is normal. No respiratory distress.      Breath sounds: Examination of the right-lower field reveals wheezing. Examination of the left-lower field reveals wheezing. Wheezing present. No rales.   Abdominal:      General: Bowel sounds are normal.      Palpations: Abdomen is soft.   Musculoskeletal:      Cervical back: Neck supple.   Neurological:      Mental Status: She is alert and oriented to person, place, and time.         Labs (personally reviewed and notable for):   Lab Results   Component Value Date/Time    SODIUM 139 04/04/2024 01:03 AM    POTASSIUM 4.4 04/04/2024 01:03 AM    CHLORIDE 105 04/04/2024 01:03 AM    CO2 19 (L) 04/04/2024 01:03 AM    GLUCOSE 172 (H) " "2024 01:03 AM    BUN 15 2024 01:03 AM    CREATININE 0.78 2024 01:03 AM      Lab Results   Component Value Date/Time    WBC 9.4 2024 01:37 AM    RBC 4.61 2024 01:37 AM    HEMOGLOBIN 13.8 2024 01:37 AM    HEMATOCRIT 41.0 2024 01:37 AM    MCV 88.9 2024 01:37 AM    MCH 29.9 2024 01:37 AM    MCHC 33.7 2024 01:37 AM    MPV 10.0 2024 01:37 AM    NEUTSPOLYS 81.00 (H) 2024 01:37 AM    LYMPHOCYTES 16.50 (L) 2024 01:37 AM    MONOCYTES 1.70 2024 01:37 AM    EOSINOPHILS 0.30 2024 01:37 AM    BASOPHILS 0.20 2024 01:37 AM      Lab Results   Component Value Date/Time    CHOLSTRLTOT 156 2024 01:03 AM    LDL 80 2024 01:03 AM    HDL 68 2024 01:03 AM    TRIGLYCERIDE 42 2024 01:03 AM       Lab Results   Component Value Date/Time    TROPONINT 241 (H) 2024 1212     No results found for: \"NTPROBNP\"    Telemetry Reviewed with Monitor Tech: sinus, rare PACs, PVCs    Radiology test Review:  EC-ECHOCARDIOGRAM COMPLETE W/O CONT   Final Result      DX-CHEST-LIMITED (1 VIEW)   Final Result         No acute cardiopulmonary abnormalities are identified.      CL-LEFT HEART CATHETERIZATION WITH POSSIBLE INTERVENTION    (Results Pending)     Echo dated 2024:  Technically difficult study, grossly normal left and right ventricular systolic function.  Mild mitral regurgitation.    Impression and Medical Decision Makin.  Non-ST elevation MI.  Troponin here is 241.  ECG just mild T wave changes.  Somewhat atypical symptoms, there may be a component of reactive airway disease.  Nevertheless no good explanation for elevated troponin.  Patient is planned for cardiac catheterization today.  - Cardiac catheterization planned for/  - Continue with enteric-coated aspirin 81 mg p.o. daily and atorvastatin 40 mg p.o. nightly    2.  Patient has history of DVT.  Xarelto has been on hold and patient has been on heparin.  Restart " Xarelto tomorrow after cardiac catheterization.    3.  Normal LVEF by echo, no signs of fluid overload.      Vivien Martinez MD  Cardiologist, [facility]    Please note that this dictation was created using voice recognition software. I have made every reasonable attempt to correct obvious errors, but it is possible there are errors of grammar and possibly content that I did not discover before finalizing the note.

## 2024-04-04 NOTE — PROGRESS NOTES
4 Eyes Skin Assessment Completed by URBANO Woodruff and URBANO Salguero.    Head WDL  Ears WDL  Nose WDL  Mouth WDL  Neck WDL  Breast/Chest WDL  Shoulder Blades WDL  Spine WDL  (R) Arm/Elbow/Hand WDL  (L) Arm/Elbow/Hand WDL  Abdomen WDL  Groin WDL  Scrotum/Coccyx/Buttocks WDL  (R) Leg WDL  (L) Leg WDL  (R) Heel/Foot/Toe WDL  (L) Heel/Foot/Toe WDL          Devices In Places Tele Box and Pulse Ox      Interventions In Place Pillows and Heels Loaded W/Pillows    Possible Skin Injury No    Pictures Uploaded Into Epic N/A  Wound Consult Placed N/A  RN Wound Prevention Protocol Ordered No

## 2024-04-04 NOTE — DISCHARGE PLANNING
Case Management Discharge Planning    Admission Date: 4/3/2024  GMLOS: 2.4  ALOS: 1    6-Clicks ADL Score: 24  6-Clicks Mobility Score: 24      Anticipated Discharge Dispo: Discharge Disposition: Discharged to home/self care (01)    DME Needed: No    Action(s) Taken: Updated Provider/Nurse on Discharge Plan and DC Assessment Complete (See below)    Escalations Completed: None    Medically Clear: No    Next Steps: No CM needs identified at this time    Barriers to Discharge: Medical clearance    Is the patient up for discharge tomorrow: No

## 2024-04-04 NOTE — ASSESSMENT & PLAN NOTE
#panic disorder  #anxiety  #depression    Patient under lots of stress as her  has an opiate addiction, and she is constantly worried about him and she is his main caretaker. History of suicide attempt many years ago, at this time no SI/HI    -cont home duloxetine  -Home mirtazapine  -As needed of Ativan  -As needed hydroxyzine

## 2024-04-04 NOTE — ASSESSMENT & PLAN NOTE
Patient presented from outside facility with troponins greater than 500.  Upon presentation to [facility] they are in the 200s, ECG with somewhat scooped ST segments as well as peaked T waves but otherwise unremarkable with respect to ischemic changes.      Cardiology was consulted and took the patient to the Cath Lab on 4/4 with no vessel disease appreciated.  They ruled this an NSTEMI.  Can consider coronary vasospasm as a differential for her atypical angina and elevated troponins.  Echo reassuring    -Heparin drip discontinued  -Can resume Xarelto 20 mg  -CBC CMP in a.m.  -Ordered enteric-coated aspirin 81 mg p.o. daily   -Continue atorvastatin 40 mg p.o. nightly

## 2024-04-04 NOTE — ASSESSMENT & PLAN NOTE
#hypoxia (resolved)    Prior to chest discomfort patient was experiencing 3 days of history of URI with increased cough and sputum production, patient has underlying COPD, patient states that she had to use her albuterol multiple times as well as nebulized albuterol which seemed to only help her breathing slightly.  Patient has expiratory wheezes on admission.  Patient not on maintenance inhaler at home.    -Prednisone course  -DuoNebs as needed  -Continuous pulse ox  -RT consult  -Continue home albuterol as needed  -O2 supplementation as needed  -Start LABA/LAMA inhaler

## 2024-04-04 NOTE — PROGRESS NOTES
Report received from URBANO Mooney, assumed care of pt. Patient transported to room with all belongings. Pt A&Ox4. Plan of care discussed with pt, labs and chart reviewed. All needs met at this time. Tele box on. On roomair. Call light within reach, bed locked and in lowest position. All fall precautions and hourly rounding in place.

## 2024-04-04 NOTE — CARE PLAN
The patient is Stable - Low risk of patient condition declining or worsening    Shift Goals  Clinical Goals: Monitor labs, Cath,  Patient Goals: decrease anxiety, rest  Family Goals: REYNA    Progress made toward(s) clinical / shift goals:    Problem: Psychosocial  Goal: Patient's level of anxiety will decrease  4/4/2024 1458 by Lisa Qiu R.N.  Outcome: Progressing  Flowsheets (Taken 4/4/2024 1458)  Decrease Anxiety Level: Pharmacologic management per MD order  Patient Behaviors: Anxious  Note: Pt has been anxious throughout her stay on the unit. Pt has had cath today and feels less anxious about her chest pain as cath was clean. Pt able to verbalize this to RN when he returned to the unit after cath.  4/4/2024 1457 by Lisa Qiu, R.N.  Outcome: Progressing     Problem: Infection - Standard  Goal: Patient will remain free from infection  4/4/2024 1458 by Lisa Qiu, R.N.  Outcome: Progressing  Note: Pt remains free from infections as evidenced by normal labs and afebrile  4/4/2024 1457 by Lisa Qiu, R.N.  Outcome: Progressing

## 2024-04-04 NOTE — ASSESSMENT & PLAN NOTE
Patient reports symptoms of mild insomnia that she treats at home with as needed Tylenol PM.  She is interested in active sleeping medication Benadryl.  -As needed Benadryl

## 2024-04-04 NOTE — PROCEDURES
Cardiac Catheterization Laboratory Procedure Note    DATE: 4/4/2024    : Calin Gore MD, MPH    PROCEDURES PERFORMED:  Left heart catheterization  Coronary angiography  Ultrasound-guided right radial arterial access  Moderate conscious sedation    INDICATIONS:  NSTEMI    CONSENT:  The complete alternatives, risks, and benefits of the procedure were explained to the patient. Informed consent was obtained prior to the procedure.    MEDICATIONS:  Lidocaine  Fentanyl  Midazolam  Nitroglycerin  Verapamil  Heparin    MODERATE CONSCIOUS SEDATION:  I personally supervised the administration of moderate conscious sedation by the nursing staff for 8 minutes.  Start time: 1342  End time: 1350    CONTRAST: Omnipaque 18 cc    RADIATION DOSE (Air Kerma): 689 mGy    FLUOROSCOPY TIME: 1.2 minutes    ACCESS:  6-Wallisian Glidesheath in the right radial artery    ESTIMATED BLOOD LOSS: <10 cc    COMPLICATIONS: None    PROCEDURE IN DETAIL:  The patient was brought to the cardiac catheterization laboratory in the fasting state. The skin over the right wrist was prepped and draped in the usual sterile fashion. Lidocaine infiltration was used to anesthetize the tissue over the right radial artery. Using the micropuncture technique, and ultrasound guidance, a 6-Wallisian Glidesheath was inserted in the right radial artery. A 5-Fr Terumo Tiger diagnostic catheter was then advanced over a standard J-wire into the left ventricular cavity where it was gently aspirated, flushed, and then withdrawn across the aortic valve with sequential pressures measured. This catheter was then used to engage the ostium of the left coronary artery and cineangiograms were obtained in multiple projections for complete evaluation of the left coronary system. This catheter was then used to engage the ostium of the right coronary artery and cineangiograms were obtained in multiple projections for complete evaluation of the right coronary system. Following  completion of coronary angiography, all wires, catheters, and sheaths were removed.  A TR band was placed over the right wrist using the patent hemostasis technique.     HEMODYNAMICS:  Aortic pressure: 118 /73 mmHg  LVEDP: 20 mmHg  No significant aortic gradient on pullback    CORONARY ANGIOGRAPHY:  The left main coronary artery : Normal-appearing large caliber vessel that bifurcates to LAD and left circumflex  The left anterior descending coronary artery : Normal-appearing large in caliber transapical vessel with several medium caliber diagonal branches.  Mild slow coronary flow  The left circumflex coronary artery : Large-caliber normal-appearing dominant vessel that gives rise to small OM1, large OM 2 and a large left PDA.  Mild slow coronary flow  The right coronary artery  : Medium caliber nondominant vessel    IMPRESSION:  1.  Normal-appearing epicardial coronary arteries with mild slow coronary flow phenomena.  2.  Dominant left circumflex  3.  Elevated resting LVEDP 20 mmHg no significant transaortic gradient on pullback    RECOMMENDATIONS:  Primary ASCVD preventive measures.  Can consider coronary vasospasm as a differential for her atypical angina and elevated troponins.  TR band protocol  Can resume Xarelto tonight and stop heparin drip if indication for drip is NSTEMI.    NOTIFICATION:  The patient's daughter - Alayna - was called and notified of the results.    Referring Cardiologist - Dr. Martinez - was notified.    Calin Gore MD, MPH [facility]  Interventional Cardiologist  [facility]   of Clinical Internal Medicine - [facility]    lovenox administration at home/Home with Home Care Home/lovenox administration at home

## 2024-04-05 ENCOUNTER — PHARMACY VISIT (OUTPATIENT)
Dept: PHARMACY | Facility: MEDICAL CENTER | Age: 64
End: 2024-04-05
Payer: COMMERCIAL

## 2024-04-05 VITALS
HEIGHT: 64 IN | BODY MASS INDEX: 31.69 KG/M2 | HEART RATE: 66 BPM | RESPIRATION RATE: 17 BRPM | WEIGHT: 185.63 LBS | DIASTOLIC BLOOD PRESSURE: 73 MMHG | OXYGEN SATURATION: 93 % | TEMPERATURE: 98.1 F | SYSTOLIC BLOOD PRESSURE: 142 MMHG

## 2024-04-05 PROBLEM — R07.9 CHEST PAIN: Status: RESOLVED | Noted: 2024-04-03 | Resolved: 2024-04-05

## 2024-04-05 LAB
ANION GAP SERPL CALC-SCNC: 14 MMOL/L (ref 7–16)
BUN SERPL-MCNC: 23 MG/DL (ref 8–22)
CALCIUM SERPL-MCNC: 9.3 MG/DL (ref 8.5–10.5)
CHLORIDE SERPL-SCNC: 104 MMOL/L (ref 96–112)
CO2 SERPL-SCNC: 19 MMOL/L (ref 20–33)
CREAT SERPL-MCNC: 0.92 MG/DL (ref 0.5–1.4)
ERYTHROCYTE [DISTWIDTH] IN BLOOD BY AUTOMATED COUNT: 45.7 FL (ref 35.9–50)
EXTRA TUBE BLU BLU: NORMAL
GFR SERPLBLD CREATININE-BSD FMLA CKD-EPI: 69 ML/MIN/1.73 M 2
GLUCOSE SERPL-MCNC: 203 MG/DL (ref 65–99)
HCT VFR BLD AUTO: 42.6 % (ref 37–47)
HGB BLD-MCNC: 13.9 G/DL (ref 12–16)
MCH RBC QN AUTO: 29.6 PG (ref 27–33)
MCHC RBC AUTO-ENTMCNC: 32.6 G/DL (ref 32.2–35.5)
MCV RBC AUTO: 90.6 FL (ref 81.4–97.8)
PLATELET # BLD AUTO: 229 K/UL (ref 164–446)
PMV BLD AUTO: 10.2 FL (ref 9–12.9)
POTASSIUM SERPL-SCNC: 4.4 MMOL/L (ref 3.6–5.5)
RBC # BLD AUTO: 4.7 M/UL (ref 4.2–5.4)
SODIUM SERPL-SCNC: 137 MMOL/L (ref 135–145)
WBC # BLD AUTO: 16.2 K/UL (ref 4.8–10.8)

## 2024-04-05 PROCEDURE — RXMED WILLOW AMBULATORY MEDICATION CHARGE: Performed by: BEHAVIOR ANALYST

## 2024-04-05 PROCEDURE — 85027 COMPLETE CBC AUTOMATED: CPT

## 2024-04-05 PROCEDURE — 700102 HCHG RX REV CODE 250 W/ 637 OVERRIDE(OP)

## 2024-04-05 PROCEDURE — 99238 HOSP IP/OBS DSCHRG MGMT 30/<: CPT | Mod: GC | Performed by: FAMILY MEDICINE

## 2024-04-05 PROCEDURE — 700102 HCHG RX REV CODE 250 W/ 637 OVERRIDE(OP): Performed by: BEHAVIOR ANALYST

## 2024-04-05 PROCEDURE — 80048 BASIC METABOLIC PNL TOTAL CA: CPT

## 2024-04-05 PROCEDURE — 700111 HCHG RX REV CODE 636 W/ 250 OVERRIDE (IP): Performed by: BEHAVIOR ANALYST

## 2024-04-05 PROCEDURE — 99231 SBSQ HOSP IP/OBS SF/LOW 25: CPT | Performed by: INTERNAL MEDICINE

## 2024-04-05 PROCEDURE — A9270 NON-COVERED ITEM OR SERVICE: HCPCS | Performed by: BEHAVIOR ANALYST

## 2024-04-05 PROCEDURE — A9270 NON-COVERED ITEM OR SERVICE: HCPCS

## 2024-04-05 RX ORDER — ASPIRIN 81 MG/1
81 TABLET ORAL DAILY
Qty: 30 TABLET | Refills: 0 | Status: SHIPPED | OUTPATIENT
Start: 2024-04-06

## 2024-04-05 RX ORDER — PREDNISONE 20 MG/1
40 TABLET ORAL DAILY
Qty: 4 TABLET | Refills: 0 | Status: SHIPPED | OUTPATIENT
Start: 2024-04-05 | End: 2024-04-07

## 2024-04-05 RX ADMIN — DULOXETINE HYDROCHLORIDE 60 MG: 60 CAPSULE, DELAYED RELEASE ORAL at 05:45

## 2024-04-05 RX ADMIN — ASPIRIN 81 MG: 81 TABLET, COATED ORAL at 05:45

## 2024-04-05 RX ADMIN — PREDNISONE 40 MG: 20 TABLET ORAL at 05:45

## 2024-04-05 RX ADMIN — ATORVASTATIN CALCIUM 40 MG: 40 TABLET, FILM COATED ORAL at 05:46

## 2024-04-05 RX ADMIN — PREGABALIN 200 MG: 100 CAPSULE ORAL at 11:32

## 2024-04-05 RX ADMIN — PREGABALIN 200 MG: 100 CAPSULE ORAL at 05:46

## 2024-04-05 RX ADMIN — UMECLIDINIUM BROMIDE AND VILANTEROL TRIFENATATE 1 PUFF: 62.5; 25 POWDER RESPIRATORY (INHALATION) at 08:29

## 2024-04-05 ASSESSMENT — FIBROSIS 4 INDEX: FIB4 SCORE: 1.99

## 2024-04-05 ASSESSMENT — PAIN DESCRIPTION - PAIN TYPE: TYPE: ACUTE PAIN

## 2024-04-05 NOTE — DOCUMENTATION QUERY
[facility]                                                                       Query Response Note      PATIENT:               CHRISTIANO FLORES  ACCT #:                  [id number]  MRN:                     [id number]  :                      1960  ADMIT DATE:       4/3/2024 11:37 AM  DISCH DATE:          RESPONDING  PROVIDER #:        [id number]           QUERY TEXT:    NSTEMI documented in the medical record. No evidence of ischemia noted on EKG, ECHO or LHC.  LHC with normal-appearing epicardial coronary arteries with mild slow coronary flow phenomena.  Heparin gtt discontinued.    Please clarify status of this condition after study.       The patient's Clinical Indicators include:  Clinical Findings:   4/3 OSH troponin: 141-->533  4/3 Troponin 241  4/3 EKG: no acute ischemia  4/3 ECHO: EF 55%.Accurate assessment of wall motion difficult due to poor definition of endocardial border. Indeterminate diastolic function.     LHC: Normal-appearing epicardial coronary arteries with mild slow coronary flow  -can consider coronary vasospasm as a differential for her atypical angina and elevated troponins.        Risk Factors: HTN, Hx of smoking, current vape use, stress, COPD, HLD    Treatments: Cardiology consult, monitor labs, EKG, ECHO, LHC, heparin gtt (discontinued)      Contact me with any questions.    Thank you for your time and attention,  Natalie Connors RN, CDI  [email or web address]  Connect via email, Voalte or messenger.  Options provided:   -- NSTEMI is ruled out after study   -- NSTEMI is ruled in   -- Other explanation, (please specify other explanation)   -- Unable to determine      Query created by: Natalie Connors on 2024 9:00 AM    RESPONSE TEXT:    NSTEMI is ruled out after study          Electronically signed by:  JOON KAISER MD 2024 3:35 PM

## 2024-04-05 NOTE — PROGRESS NOTES
Cardiology Follow-up Consult Note    Date of Service:    4/5/2024      Consulting Physician: Ana M Reagan M.D.    Name:   Iesha Swanson   YOB: 1960  Age:   63 y.o.  female   MRN:   [id number]      Subjective:  Patient feels fine, breathing is better.  No problems from the cardiac cath.    All other review of systems reviewed and negative.    Past medical, surgical, social, and family history reviewed and unchanged from admission except as noted in assessment and plan.    Medications Prior to Admission   Medication Sig Dispense Refill Last Dose    B Complex Vitamins (B COMPLEX PO) Take 1 Tablet by mouth every day.   4/2/2024 at 0700    MAGNESIUM PO Take 1 Tablet by mouth every day.   4/2/2024 at pm    POTASSIUM PO Take 1 Tablet by mouth 2 times a day.   4/2/2024 at pm    metoprolol tartrate (LOPRESSOR) 25 MG Tab Take 25 mg by mouth 2 times a day.   4/2/2024 at pm    XARELTO 20 MG Tab tablet Take 1 Tablet by mouth with dinner. 90 Tablet 3 4/2/2024 at am    PROAIR  (90 Base) MCG/ACT Aero Soln inhalation aerosol Inhale 2-3 Puffs every 6 hours as needed for Shortness of Breath.   4/2/2024 at pm    atorvastatin (LIPITOR) 40 MG Tab Take 40 mg by mouth every day.   4/2/2024 at 0700    DULoxetine (CYMBALTA) 60 MG Cap DR Particles delayed-release capsule Take 60 mg by mouth every day.   4/2/2024 at 0700    fluticasone (FLONASE) 50 MCG/ACT nasal spray Administer 2 Sprays into affected nostril(S) every day.   4/2/2024 at 0700    pregabalin (LYRICA) 200 MG capsule Take 200 mg by mouth in the morning, at noon, and at bedtime.   4/2/2024 at afternoon     Current Facility-Administered Medications   Medication Dose Frequency Provider Last Rate Last Admin    aspirin EC tablet 81 mg  81 mg DAILY Umer Shaikh M.D.   81 mg at 04/05/24 0545    hydrOXYzine HCl (Atarax) tablet 25 mg  25 mg BID PRN Umer Shaikh M.D.        diphenhydrAMINE (Benadryl) tablet/capsule 25 mg  25 mg Q6HRS PRN Umer SHER  MORRIS Shaikh        LORazepam (Ativan) tablet 0.5 mg  0.5 mg Q4HRS PRN Umer Shaikh M.D.        acetaminophen (Tylenol) tablet 650 mg  650 mg Q6HRS PRN Marcus Singer M.D.        senna-docusate (Pericolace Or Senokot S) 8.6-50 MG per tablet 2 Tablet  2 Tablet Q EVENING Marcus Singer M.D.   2 Tablet at 04/04/24 1705    And    polyethylene glycol/lytes (Miralax) Packet 1 Packet  1 Packet QDAY PRN Marcus Singer M.D.        labetalol (Normodyne/Trandate) injection 10 mg  10 mg Q4HRS PRN Marcus Singer M.D.        ondansetron (Zofran) syringe/vial injection 4 mg  4 mg Q4HRS PRN Marcus Singer M.D.        ondansetron (Zofran ODT) dispertab 4 mg  4 mg Q4HRS PRN Marcus Singer M.D.        promethazine (Phenergan) tablet 12.5-25 mg  12.5-25 mg Q4HRS PRN Marcus Singer M.D.        promethazine (Phenergan) suppository 12.5-25 mg  12.5-25 mg Q4HRS PRN Marcus Singer M.D.        prochlorperazine (Compazine) injection 5-10 mg  5-10 mg Q4HRS PRN Marcus Singer M.D.        ipratropium-albuterol (DUONEB) nebulizer solution  3 mL Q4H PRN (RT) Marcus Singer M.D.        Respiratory Therapy Consult   Continuous RT Marcus Singer M.D.        predniSONE (Deltasone) tablet 40 mg  40 mg DAILY Marcus Singer M.D.   40 mg at 04/05/24 0545    atorvastatin (Lipitor) tablet 40 mg  40 mg DAILY Marcus Singer M.D.   40 mg at 04/05/24 0546    DULoxetine (Cymbalta) capsule 60 mg  60 mg DAILY Marcus Singer M.D.   60 mg at 04/05/24 0545    metoprolol tartrate (Lopressor) tablet 25 mg  25 mg BID Marcus Singer M.D.   25 mg at 04/04/24 1704    pregabalin (Lyrica) capsule 200 mg  200 mg TID Marcus Singer M.D.   200 mg at 04/05/24 0546    albuterol inhaler 2-3 Puff  2-3 Puff Q6HRS PRN Marcus Singer M.D.        rivaroxaban (Xarelto) tablet 20 mg  20 mg PM MEAL Calin Gore M.D.   20 mg at 04/04/24 1705    umeclidinium-vilanterol (Anoro Ellipta) inhaler 1 Puff  1 Puff QDAILY (RT) Marcus Singer M.D.   1 Puff at  "04/04/24 0811    mirtazapine (Remeron) tablet 15 mg  15 mg QHS Roseline Hua M.D.   15 mg at 04/04/24 2104   Last reviewed on 4/3/2024  5:43 PM by Ranjan Gomez R.N.     No Known Allergies    No intake or output data in the 24 hours ending 04/05/24 0815     Physical Exam  Body mass index is 31.86 kg/m².  /70   Pulse (!) 56   Temp 36.6 °C (97.9 °F) (Temporal)   Resp 16   Ht 1.626 m (5' 4\")   Wt 84.2 kg (185 lb 10 oz)   SpO2 96%   Vitals:    04/05/24 0329 04/05/24 0407 04/05/24 0546 04/05/24 0717   BP: 139/79  118/81 134/70   Pulse: (!) 54  (!) 56 (!) 56   Resp: 17   16   Temp: 36.5 °C (97.7 °F)   36.6 °C (97.9 °F)   TempSrc: Temporal   Temporal   SpO2: 95%   96%   Weight:  84.2 kg (185 lb 10 oz)     Height:         Oxygen Therapy:  Pulse Oximetry: 96 %, O2 (LPM): 0, O2 Delivery Device: None - Room Air    Physical Exam  Constitutional:       Appearance: Normal appearance.   Neck:      Vascular: No JVD.   Cardiovascular:      Rate and Rhythm: Normal rate and regular rhythm.      Pulses: Normal pulses and intact distal pulses.      Heart sounds: S1 normal and S2 normal. No murmur heard.     No friction rub. No gallop.      Comments: Right wrist +1 radial pulse no hematoma  Pulmonary:      Effort: Pulmonary effort is normal. No respiratory distress.      Breath sounds: Decreased breath sounds present. No wheezing or rales.   Musculoskeletal:      Cervical back: Neck supple.   Neurological:      Mental Status: She is alert and oriented to person, place, and time.         Labs (personally reviewed and notable for):   Lab Results   Component Value Date/Time    SODIUM 139 04/04/2024 01:03 AM    POTASSIUM 4.4 04/04/2024 01:03 AM    CHLORIDE 105 04/04/2024 01:03 AM    CO2 19 (L) 04/04/2024 01:03 AM    GLUCOSE 172 (H) 04/04/2024 01:03 AM    BUN 15 04/04/2024 01:03 AM    CREATININE 0.78 04/04/2024 01:03 AM      Lab Results   Component Value Date/Time    WBC 16.2 (H) 04/05/2024 01:52 AM    RBC 4.70 04/05/2024 " "01:52 AM    HEMOGLOBIN 13.9 2024 01:52 AM    HEMATOCRIT 42.6 2024 01:52 AM    MCV 90.6 2024 01:52 AM    MCH 29.6 2024 01:52 AM    MCHC 32.6 2024 01:52 AM    MPV 10.2 2024 01:52 AM    NEUTSPOLYS 81.00 (H) 2024 01:37 AM    LYMPHOCYTES 16.50 (L) 2024 01:37 AM    MONOCYTES 1.70 2024 01:37 AM    EOSINOPHILS 0.30 2024 01:37 AM    BASOPHILS 0.20 2024 01:37 AM      Lab Results   Component Value Date/Time    CHOLSTRLTOT 156 2024 01:03 AM    LDL 80 2024 01:03 AM    HDL 68 2024 01:03 AM    TRIGLYCERIDE 42 2024 01:03 AM       Lab Results   Component Value Date/Time    TROPONINT 241 (H) 2024 1212     No results found for: \"NTPROBNP\"    Telemetry Reviewed with Monitor Tech: sinus rhythm, rare PACs, PVCs    Cath 24:  HEMODYNAMICS:  Aortic pressure: 118 /73 mmHg  LVEDP: 20 mmHg  No significant aortic gradient on pullback     CORONARY ANGIOGRAPHY:  The left main coronary artery : Normal-appearing large caliber vessel that bifurcates to LAD and left circumflex  The left anterior descending coronary artery : Normal-appearing large in caliber transapical vessel with several medium caliber diagonal branches.  Mild slow coronary flow  The left circumflex coronary artery : Large-caliber normal-appearing dominant vessel that gives rise to small OM1, large OM 2 and a large left PDA.  Mild slow coronary flow  The right coronary artery  : Medium caliber nondominant vessel    Radiology test Review:  EC-ECHOCARDIOGRAM COMPLETE W/O CONT   Final Result      DX-CHEST-LIMITED (1 VIEW)   Final Result         No acute cardiopulmonary abnormalities are identified.      CL-LEFT HEART CATHETERIZATION WITH POSSIBLE INTERVENTION    (Results Pending)       Impression and Medical Decision Makin.  Non-ST elevation microinfarction with cardiac catheterization showing normal epicardial disease.  This possibly could be MINOCA (myocardial infarction with " nonobstructive coronary disease).  She could have microvascular disease.  No further cardiac workup indicated this hospitalization.  - Enteric-coated aspirin 81 mg p.o. daily  - Atorvastatin 40 mg p.o. nightly  - Will have patient follow-up with cardiology in [place] which is closest to her    Cardiology will sign off at this time.  Please call with any questions or concerns.    Vivien Martinez MD  Cardiologist, [facility]    Please note that this dictation was created using voice recognition software. I have made every reasonable attempt to correct obvious errors, but it is possible there are errors of grammar and possibly content that I did not discover before finalizing the note.

## 2024-04-05 NOTE — RESPIRATORY CARE
"  COPD EDUCATION by COPD CLINICAL EDUCATOR  4/4/2024  at  1535 PM by Nani Armstrong RRT     Patient interviewed by education team.  Patient declined or is unable to participate in the full program.  Therefore, a short intervention has been conducted.  A comprehensive packet including information about COPD, types of treatments to manage their disease and safe home Oxygen usage was provided and reviewed with patient and family members at the bedside.         Smoking Cessation Intervention and education completed, 11 minutes spent on smoking cessation education with patient.  Provided smoking cessation packet with \"Tips to Quit\" and brochure for \"Free Smoking Cessation Classes\".     COPD Screen  COPD Risk Screening  Do you have a history of COPD?: Yes  Do you have a Pulmonologist?: No    COPD Assessment  COPD Clinical Specialists ONLY  COPD Education Initiated: Yes--Short Intervention (Pt from [facility]- remote HX of COPD per chart- MARIS on home CPAP. Provided pt with COPD + smokng cessaiton handouts + class enrollment info.)  Is this a COPD exacerbation patient?: No  DME Equipment Type: Home 02 @2LNC + Home CPAP unit  Physician Name: Adelita Napoles M.D.  Referrals Initiated: Yes  Pulmonary Rehab: N/A  Smoking Cessation: Yes  $ Smoking Cessation >10 Minutes: Symptomatic (Pt admits she has long histroy of smoking - currently vapes now. Discussed smoking cessation resources/ tips/ triack etc along with providing her with eduational handouts + class info. Pt seemed receptive to info and understands the risk vs benefits)  $ Demo/Eval of SVN's, MDI's and Aerosols: Yes (reviewed respiratory medications and proper use)  (OP) Pulmonary Function Testing:  (Unable to locate in current chart - pt does not recall having testing done)  Interdisciplinary Rounds: Attendance at Rounds (30 Min)    PFT Results    No results found for: \"PFT\"    Meds to Beds        MY COPD ACTION PLAN     It is recommended that patients and physicians " "/healthcare providers complete this action plan together. This plan should be discussed at each physician visit and updated as needed.    The green, yellow and red zones show groups of symptoms of COPD. This list of symptoms is not comprehensive, and you may experience other symptoms. In the \"Actions\" column, your healthcare provider has recommended actions for you to take based on your symptoms.    Patient Name: Iesha Swanson   YOB: 1960   Last Updated on: 4/4/2024  5:29 PM   Green Zone:  I am doing well today Actions     Usual activitiy and exercise level   Take daily medications     Usual amounts of cough and phlegm/mucus   Use oxygen as prescribed     Sleep well at night   Continue regular exercise/diet plan     Appetite is good   At all times avoid cigarette smoke, inhaled irritants     Daily Medications (these medications are taken every day):                Yellow Zone:  I am having a bad day or a COPD flare Actions     More breathless than usual   Continue daily medications     I have less energy for my daily activities   Use quick relief inhaler as ordered     Increased or thicker phlegm/mucus   Use oxygen as prescribed     Using quick relief inhaler/nebulizer more often   Get plenty of rest     Swelling of ankles more than usual   Use pursed lip breathing     More coughing than usual   At all times avoid cigarette smoke, inhaled irritants     I feel like I have a \"chest cold\"     Poor sleep and my symptoms woke me up     My appetite is not good     My medicine is not helping      Call provider immediately if symptoms don’t improve     Continue daily medications, add rescue medications:   Albuterol 2-4 Puffs Every 6 hours PRN       Medications to be used during a flare up, (as Discussed with Provider):           Additional Information:  Use as directed and with spacer     Red Zone:  I need urgent medical care Actions     Severe shortness of breath even at rest   Call 911 or seek " medical care immediately     Not able to do any activity because of breathing      Fever or shaking chills      Feeling confused or very drowsy       Chest pains      Coughing up blood

## 2024-04-05 NOTE — PROGRESS NOTES
Bedside report received by RN and patient care assumed. Tele Box in place, patient is A&O4, resting in bed. Patient states 0/10 pain. Fall precautions in place and patient educated on use of call light for assistance. Pt updated on POC with no questions or concerns. Hourly monitoring initiated. MD at bedside.

## 2024-04-05 NOTE — DISCHARGE SUMMARY
[facility] Discharge Summary    Attending: Taya Reyes MD  Senior Resident: Dr. Singer  Intern:  Dr. Shaikh  Contact Number: [phone]    CHIEF COMPLAINT ON ADMISSION  Chief Complaint   Patient presents with    Shortness of Breath     Pt woke this morning at 3:00 am with SOB. Pt was brought to hospital by police  and pt was found to have elevated troponin at 141 with repeat of 533. Pt denies SOB at this tie, chest pain or nausea.        Reason for Admission  Ems     Admission Date  4/3/2024    CODE STATUS  Full Code    HPI & HOSPITAL COURSE  64yo F w/ pmh of ukn clotting disease c/b many DVT on chx xarelto, PAD (lt SFA occlusion), htn, hld, copd (not on O2) transferred from [facility] d/t elevated troponins admitted for ACS rule out.  Patient initially required some oxygen supplementation and had expiratory wheezes.     S/p cardiac cath found no occlusive disease, cardiology concluded microvascular disease versus vasospastic etiology.  Echo WNL, EF 55%.  Recommendations include starting daily aspirin in addition to Xarelto and have cleared patient for discharge with follow-up with cardiology outpatient.    Patient also deemed to have COPD exacerbation, that improved with DuoNeb treatment and a course of steroids (rx for 2 more days upon discharge, to finish 5-day course).  Oxygen ambulatory evaluation unremarkable.  Patient to D/C on LABA/LAMA inhaler started this admission.    Patient vital signs stable, on room air, stable for discharge.    No notes on file    Therefore, she is discharged in fair and stable condition to home with close outpatient follow-up.    The patient recovered much more quickly than anticipated on admission.    Discharge Date  4/5/2024    Physical Exam on Day of Discharge  Physical Exam  Constitutional:       Appearance: Normal appearance.   HENT:      Mouth/Throat:      Mouth: Mucous membranes are moist.   Eyes:      Extraocular Movements: Extraocular  movements intact.   Cardiovascular:      Rate and Rhythm: Normal rate and regular rhythm.      Pulses: Normal pulses.      Heart sounds: Normal heart sounds.   Pulmonary:      Effort: Pulmonary effort is normal. No respiratory distress.      Breath sounds: Normal breath sounds. No wheezing.   Abdominal:      General: Abdomen is flat. Bowel sounds are normal.   Musculoskeletal:         General: Normal range of motion.   Skin:     General: Skin is warm.      Capillary Refill: Capillary refill takes less than 2 seconds.   Neurological:      General: No focal deficit present.      Mental Status: She is alert.   Psychiatric:         Mood and Affect: Mood normal.         FOLLOW UP ITEMS POST DISCHARGE  With outpatient cardiac  With PCP    DISCHARGE DIAGNOSES  Principal Problem (Resolved):    Chest pain (POA: Yes)  Active Problems:    COPD exacerbation (HCC) (POA: Unknown)    HTN (hypertension) (POA: Unknown)    HLD (hyperlipidemia) (POA: Unknown)    Mental health disorder (POA: Unknown)    Insomnia (POA: Unknown)      FOLLOW UP  Future Appointments   Date Time Provider Department Center   5/2/2024 11:15 AM Rao Osei M.D. [facility] None     Adelita Napoles M.D.  [address] [place] [id number]  [phone]    Follow up in 1 week(s)  Please call to schedule a follow up appointment for your most recent hospitalization      MEDICATIONS ON DISCHARGE     Medication List        START taking these medications        Instructions   Anoro Ellipta 62.5-25 MCG/ACT Aepb inhaler  Start taking on: April 6, 2024  Generic drug: umeclidinium-vilanterol   Inhale 1 Puff every day.  Dose: 1 Puff     Aspirin Low Dose 81 MG EC tablet  Start taking on: April 6, 2024  Generic drug: aspirin   Take 1 Tablet by mouth every day.  Dose: 81 mg     predniSONE 20 MG Tabs  Commonly known as: Deltasone   Take 2 Tablets by mouth every day for 2 days.  Dose: 40 mg            CONTINUE taking these medications        Instructions    atorvastatin 40 MG Tabs  Commonly known as: Lipitor   Take 40 mg by mouth every day.  Dose: 40 mg     B COMPLEX PO   Take 1 Tablet by mouth every day.  Dose: 1 Tablet     DULoxetine 60 MG Cpep delayed-release capsule  Commonly known as: Cymbalta   Take 60 mg by mouth every day.  Dose: 60 mg     fluticasone 50 MCG/ACT nasal spray  Commonly known as: Flonase   Administer 2 Sprays into affected nostril(S) every day.  Dose: 2 Spray     MAGNESIUM PO   Take 1 Tablet by mouth every day.  Dose: 1 Tablet     metoprolol tartrate 25 MG Tabs  Commonly known as: Lopressor   Take 25 mg by mouth 2 times a day.  Dose: 25 mg     POTASSIUM PO   Take 1 Tablet by mouth 2 times a day.  Dose: 1 Tablet     pregabalin 200 MG capsule  Commonly known as: Lyrica   Take 200 mg by mouth in the morning, at noon, and at bedtime.  Dose: 200 mg     ProAir  (90 Base) MCG/ACT Aers inhalation aerosol  Generic drug: albuterol   Inhale 2-3 Puffs every 6 hours as needed for Shortness of Breath.  Dose: 2-3 Puff     Xarelto 20 MG Tabs tablet  Generic drug: rivaroxaban   Take 1 Tablet by mouth with dinner.  Dose: 20 mg              Allergies  No Known Allergies    DIET  Orders Placed This Encounter   Procedures    Diet Order Diet: Cardiac     Standing Status:   Standing     Number of Occurrences:   1     Order Specific Question:   Diet:     Answer:   Cardiac [6]       ACTIVITY  As tolerated.  Weight bearing as tolerated    CONSULTATIONS  Cardiology    PROCEDURES  Cardiac cath    LABORATORY  Lab Results   Component Value Date    SODIUM 137 04/05/2024    POTASSIUM 4.4 04/05/2024    CHLORIDE 104 04/05/2024    CO2 19 (L) 04/05/2024    GLUCOSE 203 (H) 04/05/2024    BUN 23 (H) 04/05/2024    CREATININE 0.92 04/05/2024        Lab Results   Component Value Date    WBC 16.2 (H) 04/05/2024    HEMOGLOBIN 13.9 04/05/2024    HEMATOCRIT 42.6 04/05/2024    PLATELETCT 229 04/05/2024

## 2024-04-05 NOTE — CARE PLAN
The patient is Stable - Low risk of patient condition declining or worsening    Shift Goals  Clinical Goals: Monitor labs, VSS  Patient Goals: Rest, go home  Family Goals: REYNA      Problem: Knowledge Deficit - Standard  Goal: Patient and family/care givers will demonstrate understanding of plan of care, disease process/condition, diagnostic tests and medications  Outcome: Progressing     Problem: Psychosocial  Goal: Patient's level of anxiety will decrease  Outcome: Progressing  Goal: Patient's ability to verbalize feelings about condition will improve  Outcome: Progressing  Goal: Patient's ability to re-evaluate and adapt role responsibilities will improve  Outcome: Progressing  Goal: Patient and family will demonstrate ability to cope with life altering diagnosis and/or procedure  Outcome: Progressing  Goal: Spiritual and cultural needs incorporated into hospitalization  Outcome: Progressing     Problem: Communication  Goal: The ability to communicate needs accurately and effectively will improve  Outcome: Progressing     Problem: Hemodynamics  Goal: Patient's hemodynamics, fluid balance and neurologic status will be stable or improve  Outcome: Progressing     Problem: Infection - Standard  Goal: Patient will remain free from infection  Outcome: Progressing

## 2024-04-05 NOTE — PROGRESS NOTES
Monitor Summary:    Rhythm: Sinus Rhythm  Rate: 53-80bpm  Ectopy: Occasional PVC's  Measurements: .16/.08/.46

## 2024-05-02 ENCOUNTER — OFFICE VISIT (OUTPATIENT)
Dept: CARDIOLOGY | Facility: PHYSICIAN GROUP | Age: 64
End: 2024-05-02
Payer: MEDICARE

## 2024-05-02 VITALS
DIASTOLIC BLOOD PRESSURE: 70 MMHG | OXYGEN SATURATION: 94 % | HEIGHT: 64 IN | WEIGHT: 181 LBS | SYSTOLIC BLOOD PRESSURE: 128 MMHG | HEART RATE: 56 BPM | BODY MASS INDEX: 30.9 KG/M2 | RESPIRATION RATE: 16 BRPM

## 2024-05-02 DIAGNOSIS — I10 PRIMARY HYPERTENSION: Chronic | ICD-10-CM

## 2024-05-02 DIAGNOSIS — I25.2 HISTORY OF MYOCARDIAL INFARCTION: ICD-10-CM

## 2024-05-02 DIAGNOSIS — E78.5 DYSLIPIDEMIA: Chronic | ICD-10-CM

## 2024-05-02 DIAGNOSIS — I73.9 PAD (PERIPHERAL ARTERY DISEASE) (HCC): ICD-10-CM

## 2024-05-02 PROBLEM — G47.30 SLEEP APNEA: Status: ACTIVE | Noted: 2024-05-02

## 2024-05-02 PROBLEM — M81.0 OSTEOPOROSIS: Status: ACTIVE | Noted: 2024-05-02

## 2024-05-02 PROBLEM — K21.9 GASTROESOPHAGEAL REFLUX DISEASE: Status: ACTIVE | Noted: 2024-05-02

## 2024-05-02 PROBLEM — F32.A DEPRESSIVE DISORDER: Status: ACTIVE | Noted: 2024-05-02

## 2024-05-02 PROBLEM — M79.7 FIBROMYALGIA: Status: ACTIVE | Noted: 2024-05-02

## 2024-05-02 PROCEDURE — 3078F DIAST BP <80 MM HG: CPT | Performed by: INTERNAL MEDICINE

## 2024-05-02 PROCEDURE — 99214 OFFICE O/P EST MOD 30 MIN: CPT | Performed by: INTERNAL MEDICINE

## 2024-05-02 PROCEDURE — G2211 COMPLEX E/M VISIT ADD ON: HCPCS | Performed by: INTERNAL MEDICINE

## 2024-05-02 PROCEDURE — 3074F SYST BP LT 130 MM HG: CPT | Performed by: INTERNAL MEDICINE

## 2024-05-02 RX ORDER — ATORVASTATIN CALCIUM 80 MG/1
80 TABLET, FILM COATED ORAL DAILY
Qty: 90 TABLET | Refills: 3 | Status: SHIPPED | OUTPATIENT
Start: 2024-05-02

## 2024-05-02 RX ORDER — MIRTAZAPINE 15 MG/1
TABLET, FILM COATED ORAL
COMMUNITY

## 2024-05-02 RX ORDER — RIVAROXABAN 20 MG/1
20 TABLET, FILM COATED ORAL
Qty: 90 TABLET | Refills: 3 | Status: SHIPPED | OUTPATIENT
Start: 2024-05-02

## 2024-05-02 RX ORDER — ASPIRIN 81 MG/1
81 TABLET, CHEWABLE ORAL DAILY
COMMUNITY
Start: 2024-05-02

## 2024-05-02 RX ORDER — ISOSORBIDE MONONITRATE 30 MG/1
30 TABLET, EXTENDED RELEASE ORAL EVERY MORNING
Qty: 90 TABLET | Refills: 3 | Status: SHIPPED | OUTPATIENT
Start: 2024-05-02

## 2024-05-02 ASSESSMENT — ENCOUNTER SYMPTOMS
FEVER: 0
BLURRED VISION: 0
CLAUDICATION: 0
FOCAL WEAKNESS: 0
NAUSEA: 0
DIZZINESS: 0
FALLS: 0
CHILLS: 0
SORE THROAT: 0
SHORTNESS OF BREATH: 1
WEAKNESS: 0
BRUISES/BLEEDS EASILY: 0
PND: 0
ABDOMINAL PAIN: 0
COUGH: 0
PALPITATIONS: 0

## 2024-05-02 ASSESSMENT — FIBROSIS 4 INDEX: FIB4 SCORE: 1.99

## 2024-05-02 NOTE — PATIENT INSTRUCTIONS
Work on at least 1.5 hours a week of moderate exercise (typical brisk walking or similar activity)    Please look into the following diets and incorporate them into your diet    LOW SALT DIET   KEEP YOUR SODIUM EQUAL TO CALORIES AND NO MORE THAN DOUBLE THE CALORIES FOR A LOW SALT DIET    Cardiosmart.org - great resource for American College of Cardiology on heart disease prevention and treatment    Please work on increasing these foods in your diet to increase your potassium levels    Highest potassium foods  Dried figs, molasses, seaweed    High potassium foods  Dried fruits (dates, prunes), nuts, avocados, bran cereal, wheat germ, lima beans    Potassium-rich food  Vegetables-spinach, tomatoes, broccoli, winter squash, beets, carrots, cauliflower, potatoes    Fruits-bananas, cantaloupe, kiwis, oranges, mangoes    Meats-ground beef, steak, pork, veal, lamb    *Adapted: Edwin CASTLE. Hypokalemia. [place] Journal of Medicine 1998; 339:451.    NATURAL SUPPLEMENTS  A total of 884 randomized controlled intervention trials evaluating 27 types of micronutrients among [id number],627 participants (4,895,544 person-years) were identified. Supplementation with n-3 fatty acid, n-6 fatty acid, l-arginine, l-citrulline, folic acid, vitamin D, magnesium, zinc, ?-lipoic acid, coenzyme Q10, melatonin, catechin, curcumin, flavanol, genistein, and quercetin showed moderate- to high-quality evidence for reducing CVD risk factors. Specifically, n-3 fatty acid supplementation decreased CVD mortality (relative risk [RR]: 0.93; 95% CI: 0.88-0.97), myocardial infarction (RR: 0.85; 95% CI: 0.78-0.92), and coronary heart disease events (RR: 0.86; 95% CI: 0.80-0.93). Folic acid supplementation decreased stroke risk (RR: 0.84; 95% CI: 0.72-0.97), and coenzyme Q10 supplementation decreased all-cause mortality events (RR: 0.68; 95% CI: 0.49-0.94). Vitamin C, vitamin D, vitamin E, and selenium showed no effect on CVD or type 2 diabetes risk.  ?-carotene supplementation increased all-cause mortality (RR: 1.10; 95% CI: 1.05-1.15), CVD mortality events (RR: 1.12; 95% CI: 1.06-1.18), and stroke risk (RR: 1.09; 95% CI: 1.01-1.17).           https://www.jacc.org/doi/10.1016/j.jacc.2022.09.048      A - Antiplatelet - Clopidogrel (PLAVIX) reduces your risk of cardiac event by 27% compared to Aspirin 81 mg daily (HOST EXAM study 2021), prasrugrel (EFFIENT), ticagrelor (BRILINTA)) may be used for the first year.  Aspirin 81 mg daily is associated with a 20% less use of heart event and is used the first year after a cardiac event, stent or CABG.  B - Blood Pressure Control - reduces your risk or heart attack and stroke, the goal is <130/80.  C - Cholesterol Management - statins dramatically reduce your risk; for those that are intolerant to statins, there are alternatives.  D - Diet - MEDITERRANEAN DIET or Cardiac rehab diets, Cardiosmart.org.  E - Exercise - at least 2.5 hours of moderate exercise weekly  (typical brisk walking or similar activity).  F - Fats - VASCEPA, or EPA Fish oil (if Vascepa too expensive) for elevated triglycerides (REDUCE IT trial showed reduction from 22% 5 year MACE to 17%).  G - Good Vibes - meditation, exercise, yoga, Pilates, mindfulness, [place], stress reduction.  H - Heart Failure - betablockers, sucubatril (ENTRESTO) 16% less risk of dying over 3 years, spironolactone, empagliflozin (JARDIANCE) 17% less risk of dying over 2 years, CRT +/- ICD.  I - Inflammation - Colchicine in the LoDoCo2 study in 2020 reduced the risk of heart attack by 30% in 2.5 year follow up.  R - Rehab - Cardiac Rehab reduces risk of dying by 13-24% and need to go to the hospital by 30% within the first year. Compared to regular Cardiac Rehab, Intensive Cardiac Rehab ([place] [facility]) was shown to reduce the risk of major events 17 to 11% and hospitalization for CHF from 8% to 2%. (Nutrients 2021Nov13(11):5691)  S - Smoking - never smoke, if you do smoke ask  for help to quit for good. Patients who quit smoking after heart attack have 36% less likely risk of dying.  Resources are 1-800-QUIT-NOW SinDelantal.Mx.Course Hero in addition to Chantix, bupropion (Zyban) or nicotine replacement  T - Type II Diabetes - pills empagliflozin (JARDIANCE) 38% less risk of dying over 4 years, and/or weekly injections: tirzepatide (Mounjaro), semaglutide (Ozempic), liraglutide (Victoza), dulaglutide (Trulicity) ~26% less risk of MACE in 2 years.  V - Vaccines - Annual flu shot and COVID vaccine reduces the risk of serious cardiovascular complications from these deadly infections.  W - Weight - maintain a healthy weight. Semaglutide (WEGOVY) weekly injection was shown to reduce weight by 10% and heart events by 20% for patients with CAD and BMI > 27 in the SELECT trial (6.5% vs 8% in 48 month follow up [facility] 12/2023).      Work on at least 2.5 - 5 hours a week of moderate exercise    Please look into the following diets and incorporate them into your diet  LOW SALT DIET   KEEP YOUR SODIUM EQUAL TO CALORIES AND NO MORE THAN DOUBLE THE CALORIES FOR A LOW SALT DIET    Cardiosmart.org - great resource for American College of Cardiology on heart disease prevention and treatment    FOR TREATMENT OR PREVENTION OF CORONARY ARTERY DISEASE  These three programs are approved by Medicare/Insurers for those with heart disease  [facility]  Dr. Kern's Program for Reversing Heart Disease - Ramy Jackson's Cardiologist vegetarian-based  [facility] Program - [place]-based mind-body Program    Mediterranean Diet has been shown to be a hearty healthy diet.    This is a commonly referenced Program  Dr Lawson - [place] over [employer] (book and documentary) - vegetarian-based    FOR TREATMENT OF BLOOD PRESSURE  DASH DIET - American Heart Association for treatment of HYPERTENSION    FOR TREATMENT OF BAD CHOLESTEROL/FATS  REDUCE PROCESSED SUGAR AS MUCH AS  POSSIBLE  INCREASE WHOLE GRAINS/VEGETABLES  INCREASE FIBER    Lowering total cholesterol and LDL (bad) cholesterol:  - Eat leaner cuts of meat, or eliminate altogether if possible red meat, and frequently substitute fish or chicken.  - Limit saturated fat to no more than 7-10% of total calories no more than 10 g per day is recommended. Some sources of saturated fat include butter, animal fats, hydrogenated vegetable fats and oils, many desserts, whole milk dairy products.  - Replaced saturated fats with polyunsaturated fats and monounsaturated fats. Foods high in monounsaturated fat include nuts, canola oil, avocados, and olives.  - Limit trans fat (processed foods) and replaced with fresh fruits and vegetables  - Recommend nonfat dairy products  - Increase substantially the amount of soluble fiber intake (legumes such as beans, fruit, whole grains).  - Consider nutritional supplements: plant sterile spreads such as Benecol, fish oil,  flaxseed oil, omega-3 acids EPA capsules 2000 mg twice a day, or viscous fiber such as Metamucil  - Attain ideal weight and regular exercise (at least 30 minutes per day of moderate exercise)  ASK ABOUT STATIN OR NON STATIN MEDICATION TO REDUCE YOUR LDL AND HEART RISK    Lowering triglycerides:  - Reduce intake of simple sugar: Desserts, candy, pastries, honey, sodas, sugared cereals, yogurt, Gatorade, sports bars, canned fruit, smoothies, fruit juice, coffee drinks  - Reduced intake of refined starches: Refined Pasta, most bread  - Reduce or abstain from alcohol  - Increase omega-3 fatty acids: [place], Trout, Mackerel, Herring, Albac[place] tuna and supplements  - Attain ideal weight and regular exercise (at least 30 minutes per day of moderate exercise)  ASK ABOUT PURIFIED OMEGA 3 EPA or FISH OIL TO REDUCE YOUR TG AND HEART RISK    Elevating HDL (good) cholesterol:  - Increase physical activity  - Increase omega-3 fatty acids and supplements as listed above  - Incorporating appropriate  amounts of monounsaturated fats such as nuts, olive oil, canola oil, avocados, olives  - Stop smoking  - Attain ideal weight and regular exercise (at least 30 minutes per day of moderate exercise)

## 2024-05-02 NOTE — PROGRESS NOTES
Chief Complaint   Patient presents with    Hyperlipidemia    Hypertension    COPD       Subjective     Iesha Swanson is a 63 y.o. female who presents today with history of MI 4/2024 found to have MINOCA also h/o PAD with popliteal artery occlsuion with possible blood clotting disorder    Doing well post MI but has class III dyspnea    Past Medical History:   Diagnosis Date    Depression     DVT (deep venous thrombosis) ([facility])      Past Surgical History:   Procedure Laterality Date    ZZZ CARDIAC CATH  04/04/2024    MINOCA    CARPAL TUNNEL RELEASE Bilateral     HYSTERECTOMY LAPAROSCOPY      age 29     Family History   Problem Relation Age of Onset    Heart Attack Father     Heart Disease Father     Abdominal aortic aneurysm Father      Social History     Socioeconomic History    Marital status:      Spouse name: Not on file    Number of children: Not on file    Years of education: Not on file    Highest education level: Not on file   Occupational History    Not on file   Tobacco Use    Smoking status: Never    Smokeless tobacco: Never   Substance and Sexual Activity    Alcohol use: Yes    Drug use: No    Sexual activity: Not on file   Other Topics Concern    Not on file   Social History Narrative    Not on file     Social Determinants of Health     Financial Resource Strain: Not on file   Food Insecurity: Not on file   Transportation Needs: Not on file   Physical Activity: Not on file   Stress: Not on file   Social Connections: Not on file   Intimate Partner Violence: Not on file   Housing Stability: Not on file     No Known Allergies  Outpatient Encounter Medications as of 5/2/2024   Medication Sig Dispense Refill    Tiotropium Bromide-Olodaterol (STIOLTO RESPIMAT INH) Inhale.      mirtazapine (REMERON) 15 MG Tab TAKE 1 Tablet BY MOUTH EVERY EVENING AT BEDTIME.      aspirin 81 MG EC tablet Take 1 Tablet by mouth every day. 30 Tablet 0    B Complex Vitamins (B COMPLEX PO) Take 1 Tablet by mouth every  "day.      MAGNESIUM PO Take 1 Tablet by mouth every day.      POTASSIUM PO Take 1 Tablet by mouth 2 times a day.      metoprolol tartrate (LOPRESSOR) 25 MG Tab Take 25 mg by mouth 2 times a day.      XARELTO 20 MG Tab tablet Take 1 Tablet by mouth with dinner. 90 Tablet 3    PROAIR  (90 Base) MCG/ACT Aero Soln inhalation aerosol Inhale 2-3 Puffs every 6 hours as needed for Shortness of Breath.      atorvastatin (LIPITOR) 40 MG Tab Take 40 mg by mouth every day.      DULoxetine (CYMBALTA) 60 MG Cap DR Particles delayed-release capsule Take 60 mg by mouth every day.      fluticasone (FLONASE) 50 MCG/ACT nasal spray Administer 2 Sprays into affected nostril(S) every day.      pregabalin (LYRICA) 200 MG capsule Take 200 mg by mouth in the morning, at noon, and at bedtime.      umeclidinium-vilanterol (ANORO ELLIPTA) 62.5-25 MCG/ACT AEROSOL POWDER, BREATH ACTIVATED inhaler Inhale 1 Puff every day. (Patient not taking: Reported on 5/2/2024) 60 Each 3     No facility-administered encounter medications on file as of 5/2/2024.     Review of Systems   Constitutional:  Negative for chills and fever.   HENT:  Negative for sore throat.    Eyes:  Negative for blurred vision.   Respiratory:  Positive for shortness of breath. Negative for cough.    Cardiovascular:  Negative for chest pain, palpitations, claudication, leg swelling and PND.   Gastrointestinal:  Negative for abdominal pain and nausea.   Musculoskeletal:  Negative for falls and joint pain.   Skin:  Negative for rash.   Neurological:  Negative for dizziness, focal weakness and weakness.   Endo/Heme/Allergies:  Does not bruise/bleed easily.              Objective     /70 (BP Location: Left arm, Patient Position: Sitting, BP Cuff Size: Adult)   Pulse (!) 56   Resp 16   Ht 1.626 m (5' 4\")   Wt 82.1 kg (181 lb)   SpO2 94%   BMI 31.07 kg/m²     Physical Exam  Constitutional:       General: She is not in acute distress.     Appearance: She is not " diaphoretic.   Eyes:      General: No scleral icterus.  Neck:      Vascular: No JVD.   Cardiovascular:      Rate and Rhythm: Normal rate.      Heart sounds: Normal heart sounds. No murmur heard.     No friction rub. No gallop.   Pulmonary:      Effort: No respiratory distress.      Breath sounds: No wheezing or rales.   Abdominal:      General: Bowel sounds are normal.      Palpations: Abdomen is soft.   Musculoskeletal:      Right lower leg: No edema.      Left lower leg: No edema.   Skin:     Findings: No rash.   Neurological:      Mental Status: She is alert. Mental status is at baseline.   Psychiatric:         Mood and Affect: Mood normal.            We reviewed in person the most recent labs  Recent Results (from the past 5040 hour(s))   EKG    Collection Time: 24 11:44 AM   Result Value Ref Range    Report       [facility]    Test Date:  2024  Pt Name:    CHRISTIANO FLORES             Department: ER  MRN:        [id number]                      Room:       [facility]  Gender:     Female                       Technician: [id number]  :        1960                   Requested By:ER TRIAGE PROTOCOL  Order #:    [id number]                    Reading MD: Umer WYNN MD    Measurements  Intervals                                Axis  Rate:       73                           P:          76  OK:         169                          QRS:        83  QRSD:       89                           T:          79  QT:         407  QTc:        449    Interpretive Statements  Sinus rhythm  Rate of 73  Intervals normal OK, normal QRS normal normal QTc  Axis normal  Ischemia no significant ST segment elevations or depressions noted.  This is  compared to EKG 8 2021    Electronically Signed On 2024 12:34:09 PDT by Umer WYNN MD     CBC w/ Differential    Collection Time: 24 12:12 PM   Result Value Ref Range    WBC 9.5 4.8 - 10.8 K/uL    RBC 4.44 4.20  - 5.40 M/uL    Hemoglobin 13.4 12.0 - 16.0 g/dL    Hematocrit 39.4 37.0 - 47.0 %    MCV 88.7 81.4 - 97.8 fL    MCH 30.2 27.0 - 33.0 pg    MCHC 34.0 32.2 - 35.5 g/dL    RDW 43.8 35.9 - 50.0 fL    Platelet Count 232 164 - 446 K/uL    MPV 9.6 9.0 - 12.9 fL    Neutrophils-Polys 66.40 44.00 - 72.00 %    Lymphocytes 26.20 22.00 - 41.00 %    Monocytes 6.20 0.00 - 13.40 %    Eosinophils 0.70 0.00 - 6.90 %    Basophils 0.30 0.00 - 1.80 %    Immature Granulocytes 0.20 0.00 - 0.90 %    Nucleated RBC 0.00 0.00 - 0.20 /100 WBC    Neutrophils (Absolute) 6.30 1.82 - 7.42 K/uL    Lymphs (Absolute) 2.49 1.00 - 4.80 K/uL    Monos (Absolute) 0.59 0.00 - 0.85 K/uL    Eos (Absolute) 0.07 0.00 - 0.51 K/uL    Baso (Absolute) 0.03 0.00 - 0.12 K/uL    Immature Granulocytes (abs) 0.02 0.00 - 0.11 K/uL    NRBC (Absolute) 0.00 K/uL   Complete Metabolic Panel (CMP)    Collection Time: 04/03/24 12:12 PM   Result Value Ref Range    Sodium 141 135 - 145 mmol/L    Potassium 4.3 3.6 - 5.5 mmol/L    Chloride 106 96 - 112 mmol/L    Co2 22 20 - 33 mmol/L    Anion Gap 13.0 7.0 - 16.0    Glucose 114 (H) 65 - 99 mg/dL    Bun 15 8 - 22 mg/dL    Creatinine 0.83 0.50 - 1.40 mg/dL    Calcium 9.3 8.5 - 10.5 mg/dL    Correct Calcium 9.2 8.5 - 10.5 mg/dL    AST(SGOT) 39 12 - 45 U/L    ALT(SGPT) 46 2 - 50 U/L    Alkaline Phosphatase 106 (H) 30 - 99 U/L    Total Bilirubin 0.2 0.1 - 1.5 mg/dL    Albumin 4.1 3.2 - 4.9 g/dL    Total Protein 7.1 6.0 - 8.2 g/dL    Globulin 3.0 1.9 - 3.5 g/dL    A-G Ratio 1.4 g/dL   Troponin - STAT Once    Collection Time: 04/03/24 12:12 PM   Result Value Ref Range    Troponin T 241 (H) 6 - 19 ng/L   PT/INR    Collection Time: 04/03/24 12:12 PM   Result Value Ref Range    PT 15.5 (H) 12.0 - 14.6 sec    INR 1.21 (H) 0.87 - 1.13   ESTIMATED GFR    Collection Time: 04/03/24 12:12 PM   Result Value Ref Range    GFR (CKD-EPI) 78 >60 mL/min/1.73 m 2   Heparin Anti-Xa    Collection Time: 04/03/24  5:03 PM   Result Value Ref Range    Heparin Xa  (UFH) >1.10 (HH) IU/mL   EC-ECHOCARDIOGRAM COMPLETE W/O CONT    Collection Time: 04/03/24 10:04 PM   Result Value Ref Range    Eject.Frac. MOD BP 57.43     Eject.Frac. MOD 4C 60.42     Eject.Frac. MOD 2C 50.87     Left Ventrical Ejection Fraction 55    Heparin Anti-Xa    Collection Time: 04/04/24  1:03 AM   Result Value Ref Range    Heparin Xa (UFH) 0.86 IU/mL   Comp Metabolic Panel (CMP)    Collection Time: 04/04/24  1:03 AM   Result Value Ref Range    Sodium 139 135 - 145 mmol/L    Potassium 4.4 3.6 - 5.5 mmol/L    Chloride 105 96 - 112 mmol/L    Co2 19 (L) 20 - 33 mmol/L    Anion Gap 15.0 7.0 - 16.0    Glucose 172 (H) 65 - 99 mg/dL    Bun 15 8 - 22 mg/dL    Creatinine 0.78 0.50 - 1.40 mg/dL    Calcium 9.0 8.5 - 10.5 mg/dL    Correct Calcium 8.8 8.5 - 10.5 mg/dL    AST(SGOT) 44 12 - 45 U/L    ALT(SGPT) 38 2 - 50 U/L    Alkaline Phosphatase 100 (H) 30 - 99 U/L    Total Bilirubin 0.3 0.1 - 1.5 mg/dL    Albumin 4.3 3.2 - 4.9 g/dL    Total Protein 7.2 6.0 - 8.2 g/dL    Globulin 2.9 1.9 - 3.5 g/dL    A-G Ratio 1.5 g/dL   Lipid Profile (Lipid Panel) Fasting    Collection Time: 04/04/24  1:03 AM   Result Value Ref Range    Cholesterol,Tot 156 100 - 199 mg/dL    Triglycerides 42 0 - 149 mg/dL    HDL 68 >=40 mg/dL    LDL 80 <100 mg/dL   ESTIMATED GFR    Collection Time: 04/04/24  1:03 AM   Result Value Ref Range    GFR (CKD-EPI) 85 >60 mL/min/1.73 m 2   APTT    Collection Time: 04/04/24  1:03 AM   Result Value Ref Range    APTT 72.1 (H) 24.7 - 36.0 sec   CBC with Differential    Collection Time: 04/04/24  1:37 AM   Result Value Ref Range    WBC 9.4 4.8 - 10.8 K/uL    RBC 4.61 4.20 - 5.40 M/uL    Hemoglobin 13.8 12.0 - 16.0 g/dL    Hematocrit 41.0 37.0 - 47.0 %    MCV 88.9 81.4 - 97.8 fL    MCH 29.9 27.0 - 33.0 pg    MCHC 33.7 32.2 - 35.5 g/dL    RDW 44.2 35.9 - 50.0 fL    Platelet Count 193 164 - 446 K/uL    MPV 10.0 9.0 - 12.9 fL    Neutrophils-Polys 81.00 (H) 44.00 - 72.00 %    Lymphocytes 16.50 (L) 22.00 - 41.00 %     Monocytes 1.70 0.00 - 13.40 %    Eosinophils 0.30 0.00 - 6.90 %    Basophils 0.20 0.00 - 1.80 %    Immature Granulocytes 0.30 0.00 - 0.90 %    Nucleated RBC 0.00 0.00 - 0.20 /100 WBC    Neutrophils (Absolute) 7.58 (H) 1.82 - 7.42 K/uL    Lymphs (Absolute) 1.54 1.00 - 4.80 K/uL    Monos (Absolute) 0.16 0.00 - 0.85 K/uL    Eos (Absolute) 0.03 0.00 - 0.51 K/uL    Baso (Absolute) 0.02 0.00 - 0.12 K/uL    Immature Granulocytes (abs) 0.03 0.00 - 0.11 K/uL    NRBC (Absolute) 0.00 K/uL   HEMOGLOBIN A1C    Collection Time: 04/04/24  1:37 AM   Result Value Ref Range    Glycohemoglobin 6.4 (H) 4.0 - 5.6 %    Est Avg Glucose 137 mg/dL   APTT    Collection Time: 04/04/24  7:34 AM   Result Value Ref Range    APTT 103.5 (HH) 24.7 - 36.0 sec   POCT activated clotting time device results    Collection Time: 04/04/24  1:51 PM   Result Value Ref Range    Istat Activated Clotting Time 169 (H) 74 - 137 sec   CBC WITHOUT DIFFERENTIAL    Collection Time: 04/05/24  1:52 AM   Result Value Ref Range    WBC 16.2 (H) 4.8 - 10.8 K/uL    RBC 4.70 4.20 - 5.40 M/uL    Hemoglobin 13.9 12.0 - 16.0 g/dL    Hematocrit 42.6 37.0 - 47.0 %    MCV 90.6 81.4 - 97.8 fL    MCH 29.6 27.0 - 33.0 pg    MCHC 32.6 32.2 - 35.5 g/dL    RDW 45.7 35.9 - 50.0 fL    Platelet Count 229 164 - 446 K/uL    MPV 10.2 9.0 - 12.9 fL   EXTRA TUBE,DEEPA    Collection Time: 04/05/24  1:52 AM   Result Value Ref Range    Extra Tube, Blue SORTED    Basic Metabolic Panel    Collection Time: 04/05/24  9:00 AM   Result Value Ref Range    Sodium 137 135 - 145 mmol/L    Potassium 4.4 3.6 - 5.5 mmol/L    Chloride 104 96 - 112 mmol/L    Co2 19 (L) 20 - 33 mmol/L    Glucose 203 (H) 65 - 99 mg/dL    Bun 23 (H) 8 - 22 mg/dL    Creatinine 0.92 0.50 - 1.40 mg/dL    Calcium 9.3 8.5 - 10.5 mg/dL    Anion Gap 14.0 7.0 - 16.0   ESTIMATED GFR    Collection Time: 04/05/24  9:00 AM   Result Value Ref Range    GFR (CKD-EPI) 69 >60 mL/min/1.73 m 2         Assessment & Plan     1. PAD (peripheral artery  disease) (HCC)  US-EXTREMITY ARTERY LOWER BILAT W/LENNOX (COMBO)    aspirin (ASA) 81 MG Chew Tab chewable tablet    XARELTO 20 MG Tab tablet      2. Dyslipidemia  atorvastatin (LIPITOR) 80 MG tablet      3. Primary hypertension        4. History of myocardial infarction  Coenzyme Q10 300 MG Cap    isosorbide mononitrate SR (IMDUR) 30 MG TABLET SR 24 HR          Medical Decision Making: Today's Assessment/Status/Plan:        It was my pleasure to meet with Ms. Swanson.    We addressed the management of hypertension at today's visit. Blood pressure is well controlled.  We specifically assessed the labs on hypertension treatment    We addressed the management of dyslipidemia and atherosclerosis at today's visit. She is on appropriate statin.    We addressed the management of atherosclerotic artery disease.  She is on proper antiplatelet, cholesterol management and beta-blockers as appropriate.  We addressed the potential side effects and laboratory follow-up for these medications.    I will see Ms. Swanson back in 1 year time and encouraged her to follow up with us over the phone or electronically using my [employer] as issues arise.    It is my pleasure to participate in the care of Ms. Swanson.  Please do not hesitate to contact me with questions or concerns.    Rao Osei MD PhD [facility]  Cardiologist Research [facility]    Please note that this dictation was created using voice recognition software. There may be errors I did not discover before finalizing the note.     () Today's E/M visit is associated with medical care services that serve as the continuing focal point for all needed health care services and/or with medical care services that  are part of ongoing care related to a patient's single, serious condition, or a complex condition: This includes  furnishing services to patients on an ongoing basis that result in care that is personalized  to the patient. The  services result in a comprehensive, longitudinal, and continuous  relationship with the patient and involve delivery of team-based care that is accessible, coordinated with other practitioners and providers, and integrated with the broader health  care landscape.

## 2024-07-29 ENCOUNTER — APPOINTMENT (OUTPATIENT)
Dept: RADIOLOGY | Facility: MEDICAL CENTER | Age: 64
End: 2024-07-29
Attending: INTERNAL MEDICINE
Payer: MEDICARE

## 2024-10-30 ENCOUNTER — APPOINTMENT (OUTPATIENT)
Dept: RADIOLOGY | Facility: MEDICAL CENTER | Age: 64
End: 2024-10-30
Attending: INTERNAL MEDICINE
Payer: MEDICARE